# Patient Record
Sex: FEMALE | Race: WHITE | NOT HISPANIC OR LATINO | URBAN - METROPOLITAN AREA
[De-identification: names, ages, dates, MRNs, and addresses within clinical notes are randomized per-mention and may not be internally consistent; named-entity substitution may affect disease eponyms.]

---

## 2017-08-11 ENCOUNTER — HOSPITAL ENCOUNTER (OUTPATIENT)
Dept: FAMILY MEDICINE CLINIC | Facility: CLINIC | Age: 57
Discharge: HOME | End: 2017-08-11
Attending: NURSE PRACTITIONER | Admitting: NURSE PRACTITIONER

## 2019-05-24 ENCOUNTER — CONVERSION ENCOUNTER (OUTPATIENT)
Dept: FAMILY MEDICINE CLINIC | Facility: CLINIC | Age: 59
End: 2019-05-24

## 2019-05-24 LAB
ALBUMIN SERPL-MCNC: 4 G/DL (ref 3.6–5.1)
ALBUMIN/GLOB SERPL: ABNORMAL {RATIO} (ref 1–2.1)
ALP SERPL-CCNC: 77 UNITS/L (ref 33–130)
ALT SERPL-CCNC: 42 UNITS/L (ref 6–40)
AST SERPL-CCNC: 35 UNITS/L (ref 10–35)
BILIRUB SERPL-MCNC: 0.9 MG/DL (ref 0.2–1.2)
BUN SERPL-MCNC: 12 MG/DL (ref 7–25)
BUN/CREAT SERPL: ABNORMAL (ref 6–22)
CALCIUM SERPL-MCNC: 9.2 MG/DL (ref 8.6–10.2)
CHLORIDE SERPL-SCNC: 105 MMOL/L (ref 98–110)
CHOLEST SERPL-MCNC: 155 MG/DL (ref 125–200)
CHOLEST/HDLC SERPL: ABNORMAL {RATIO}
CONV % HGB A1C: ABNORMAL %
CONV CO2: 24 MMOL/L (ref 21–33)
CONV TOTAL PROTEIN: 7.2 G/DL (ref 6.2–8.3)
CREAT UR-MCNC: 0.8 MG/DL (ref 0.6–1.1)
ERYTHROCYTE [DISTWIDTH] IN BLOOD BY AUTOMATED COUNT: 12.8 % (ref 11–15)
GLOBULIN UR ELPH-MCNC: ABNORMAL G/DL (ref 2.2–3.9)
GLUCOSE SERPL-MCNC: 153 MG/DL (ref 65–99)
HCT VFR BLD AUTO: 44.1 % (ref 35–45)
HDLC SERPL-MCNC: 48 MG/DL
HGB BLD-MCNC: 15.1 G/DL (ref 11.7–15.5)
LDLC SERPL CALC-MCNC: 69 MG/DL
MCH RBC QN AUTO: 31.4 PG (ref 27–33)
MCHC RBC AUTO-ENTMCNC: ABNORMAL % (ref 32–36)
MCV RBC AUTO: 91.8 FL (ref 80–100)
PLATELET # BLD AUTO: ABNORMAL 10*3/MM3 (ref 140–400)
PMV BLD AUTO: 10.3 FL (ref 7.5–11.5)
POTASSIUM SERPL-SCNC: 3.9 MMOL/L (ref 3.5–5.3)
RBC # BLD AUTO: ABNORMAL 10*6/MM3 (ref 3.8–5.1)
SODIUM SERPL-SCNC: 138 MMOL/L (ref 135–146)
TRIGL SERPL-MCNC: 191 MG/DL
WBC # BLD AUTO: ABNORMAL 10*3/MM3 (ref 3.8–10.8)

## 2019-05-28 ENCOUNTER — CONVERSION ENCOUNTER (OUTPATIENT)
Dept: FAMILY MEDICINE CLINIC | Facility: CLINIC | Age: 59
End: 2019-05-28

## 2019-06-05 VITALS
DIASTOLIC BLOOD PRESSURE: 84 MMHG | HEIGHT: 66 IN | WEIGHT: 230 LBS | BODY MASS INDEX: 36.96 KG/M2 | OXYGEN SATURATION: 96 % | SYSTOLIC BLOOD PRESSURE: 137 MMHG | HEART RATE: 82 BPM

## 2019-06-06 NOTE — PROGRESS NOTES
"Visit Type:  Follow-up Visit  Referring Provider:  Alexia Vang NP  Primary Provider:  Alexia Vang NP    CC:  Follow Up on Lab Results.    History of Present Illness:  58-year-old obese white female with history of hyperlipidemia,  type 2 diabetes,  intermittent migraines,  f,atigue  dysphasia,  depression anxiety,  plantar fasciitis and insomnia who comes in today for follow-up visit refill medications   since I last visit she has been to Podiatry who injected her bone spurs and states that has helped a lot    Her sugars are averaging in the 140s to 150s in the morning and 400 in the evening and I am increasing her metformin to twice a day   blood pressure 136/84 heart rate 82 she denies any chest pain,  dyspnea,  tachycardia,  dizziness or edema   patient still refusing bone scans and colonoscopy.   weight is 230.   recent blood work within normal limits with exception of fasting blood sugar 159 and triglycerides 193     increase metformin 500 mg 2 twice a day   reconsider preventative health          Vital Signs:    Patient Profile:    58 Years Old Female  Height:     65.5 inches (166.37 cm)  Weight:     230 pounds  BMI:        37.69     O2 Sat:     96 %  Temp:       98.0 degrees F oral  Pulse rate: 82 / minute  Pulse rhythm:   regular  BP Sittin / 84  (right arm)    Cuff size:  regular      Problems: Active problems were reviewed with the patient during this visit.  Medications: Medications were reviewed with the patient during this visit.  Allergies: Allergies were reviewed with the patient during this visit.  No Known Allergy.  No Known Drug Allergy.        Vitals Entered By: Vivi Sims MA (May 28, 2019 9:32 AM)    Past History  Past Medical History (reviewed - no changes required): Hypertension  Headache, Migraine  Stress Reaction  dyslipidemia  \"itis's\" EGD Dr Shah 09  Glucose intolerance  Obesity  No Drug Allergies?    Surgical History (reviewed - no changes required): Total " Abdominal Hysterectomy 1996 ovaries remain  EGD    Family History (reviewed - no changes required): FH Diabetes  FH Heart Disease  FH Hypertension  FH Dyslipidemia  FH Multiple myeloma    Social History (reviewed - no changes required):     Family History Summary:      Reviewed history Last on 09/12/2018 and no changes required:05/28/2019      General Comments - FH:  FH Diabetes  FH Heart Disease  FH Hypertension  FH Dyslipidemia  FH Multiple myeloma      Social History:     Reviewed history from 12/26/2011 and no changes required:              Risk Factors:     Smoked Tobacco Use:  Never smoker  Smokeless Tobacco Use:  Never  Passive smoke exposure:  no  Drug use:  no  HIV high-risk behavior:  no  Caffeine use:  0 drinks per day  Alcohol use:  no  Exercise:  no  Seatbelt use:  100 %  Sun Exposure:  rarely    Family History Risk Factors:     Family History of MI in females < 65 years old:  yes     Family History of MI in males < 55 years old:  no        Review of Systems     MS       heel spurs      Physical Exam    General:      obese.    Ears:      TM's intact and clear with normal canals with grossly normal hearing.    Nose:      no deformity, discharge, inflammation, or lesions.    Mouth:      no deformity or lesions with good dentition.    Lungs:      clear bilaterally to auscultation.    Heart:      non-displaced PMI, chest non-tender; regular rate and rhythm, S1, S2 without murmurs, rubs, or gallops  Abdomen:       normal bowel sounds; no hepatosplenomegaly no ventral,umbilical hernias or masses noted.    Msk:       plantar fasciitis bilateral feet improved with steroid injection  Extremities:      no clubbing, cyanosis, edema, or deformity noted with normal full range of motion of joints of all four extremities   Neurologic:      no focal deficits, cranial nerves II-XII grossly intact with normal sensation, reflexes, coordination, muscle strength and tone.    Psych:      alert and cooperative;  normal mood and affect; normal attention span and concentration.        Blood Pressure:  Today's BP: 137/84 mm Hg    Labwork:   Most Recent Lab Results:   LDL: 69 mg/dL 05/24/2019  HbA1c: : 5.9 % OF TOTAL HGB % 05/24/2019      Impression & Recommendations:    Problem # 1:  Diabetes mellitus, type II (ICD-250.00) (QIF14-H32.9)  Assessment: Deteriorated    Her updated medication list for this problem includes:     Metformin Hcl 500 Mg Oral Tablet (Metformin hcl) ..... Take one (1) tablet by mouth twice a day     Zestoretic 20-12.5 Mg Oral Tablet (Lisinopril-hydrochlorothiazide) ..... Take 1 tablet by mouth daily      Problem # 2:  Plantar fasciitis, left (ICD-728.71) (QGZ25-I50.2)  Assessment: Improved    Her updated medication list for this problem includes:     Mobic 15 Mg Oral Tablet (Meloxicam) ..... Take one tablet daily      Problem # 3:  HYPERTENSION (ICD-401.9) (COC70-K60)  Assessment: Unchanged    Her updated medication list for this problem includes:     Zestoretic 20-12.5 Mg Oral Tablet (Lisinopril-hydrochlorothiazide) ..... Take 1 tablet by mouth daily     Atenolol 50 Mg Tablet (Atenolol) ..... Take 1 tablet by mouth daily      Problem # 4:  BMI 37-37.9 adult (ICD-V85.37) (HGT12-P87.37)  Assessment: Unchanged    Medications Added to Medication List This Visit:  1)  One-a-day Womens Formula Oral Tablet (Multiple vitamins-calcium) .... Take 1 tablet by mouth daily  2)  Metformin Hcl 500 Mg Oral Tablet (Metformin hcl) .... Take one (1) tablet by mouth twice a day    Complete Medication List:  1)  One-a-day Womens Formula Oral Tablet (Multiple vitamins-calcium) .... Take 1 tablet by mouth daily  2)  Accu-chek Softclix Lancets (Lancets) .... Check blood sugar once a day  3)  Accu-chek Fastclix Lancets (Lancets) .... Check bs once a day  4)  Accu-chek Guide in Vitro Strip (Glucose blood) .... Check bs once a day  5)  Mobic 15 Mg Oral Tablet (Meloxicam) .... Take one tablet daily  6)  Metformin Hcl 500 Mg Oral  Tablet (Metformin hcl) .... Take one (1) tablet by mouth twice a day  7)  Zestoretic 20-12.5 Mg Oral Tablet (Lisinopril-hydrochlorothiazide) .... Take 1 tablet by mouth daily  8)  Atenolol 50 Mg Tablet (Atenolol) .... Take 1 tablet by mouth daily  9)  Rosuvastatin Calcium 20 Mg Tab (Rosuvastatin calcium) .... Take 1 tablet by mouth everyday at bedtime  10)  Excedrin Migraine Tablet (Aspirin-acetaminophen-caffeine tabs) .... As needed        Patient Instructions:  1)  The importance of monitoring blood sugar regularly was reviewed.  2)  The importance of keeping the blood pressure at or below 130/80 to prevent stroke, heart attacks, kidney failure, blindness, and loss of limbs was reviewed.  3)  Encouraged a low fat/low cholesterol diet and handout regarding the Therapeutic Lifestyle Change Diet was given.  4)   reconsider preventative health test  5)   decrease metformin to 500 mg twice a day    Medications:  ROSUVASTATIN CALCIUM 20 MG TAB (ROSUVASTATIN CALCIUM) TAKE 1 TABLET BY MOUTH EVERYDAY AT BEDTIME  #90[Tablet] x 1      Entered by: Vivi Sims MA      Authorized by:  Alexia Vang NP      Electronically signed by:   Vivi Sims MA on 05/28/2019      Method used:    Electronically to               [facility]* ([facility])              [address] [place] [address]              Ph: [phone]              Fax: [phone]      RxID:   [id number]  ZESTORETIC 20-12.5 MG ORAL TABLET (LISINOPRIL-HYDROCHLOROTHIAZIDE) TAKE 1 TABLET BY MOUTH DAILY  #90[Tablet] x 1      Entered by: Vivi Sims MA      Authorized by:  Alexia Vang NP      Electronically signed by:   Vivi Sims MA on 05/28/2019      Method used:    Electronically to               [facility]* ([facility])              [address] [id number]              Ph: [phone]              Fax: [phone]      RxID:   [id number]  ATENOLOL 50 MG TABLET (ATENOLOL) Take 1 tablet by mouth  daily  #90[Tablet] x 1      Entered by: Vivi Sims MA      Authorized by:  Alexia Vang NP      Electronically signed by:   Vivi Sims MA on 05/28/2019      Method used:    Electronically to               [id number]* (retail)              [address] [facility] [place]  [address]              Ph: [phone]              Fax: [phone]      RxID:   [id number]  METFORMIN  MG ORAL TABLET (METFORMIN HCL) Take one (1) tablet by mouth twice a day  #180[Tablet] x 1      Entered by: Vivi Sims MA      Authorized by:  Alexia Vang NP      Electronically signed by:   Vivi Sims MA on 05/28/2019      Method used:    Electronically to               [id number]* (retail)              [address] [place]  [address]              Ph: [phone]              Fax: [phone]      RxID:   [id number]                Medication Administration    Orders Added:  1)  Ofc Vst, Est Level IV [90432]  ]      Electronically signed by Alexia Vnag NP on 05/28/2019 at 9:58 AM  ________________________________________________________________________       Disclaimer: Converted Note message may not contain all data elements that existed in the legacy source system. Please see [employer] Legacy System for the original note details.

## 2019-12-02 ENCOUNTER — OFFICE VISIT (OUTPATIENT)
Dept: FAMILY MEDICINE CLINIC | Facility: CLINIC | Age: 59
End: 2019-12-02

## 2019-12-02 VITALS
HEIGHT: 66 IN | WEIGHT: 227 LBS | BODY MASS INDEX: 36.48 KG/M2 | HEART RATE: 92 BPM | TEMPERATURE: 98.1 F | DIASTOLIC BLOOD PRESSURE: 84 MMHG | SYSTOLIC BLOOD PRESSURE: 137 MMHG | OXYGEN SATURATION: 97 %

## 2019-12-02 DIAGNOSIS — Z23 FLU VACCINE NEED: Primary | ICD-10-CM

## 2019-12-02 DIAGNOSIS — Z78.0 POSTMENOPAUSAL: ICD-10-CM

## 2019-12-02 DIAGNOSIS — Z12.31 OTHER SCREENING MAMMOGRAM: ICD-10-CM

## 2019-12-02 DIAGNOSIS — M77.9 BONE SPUR: ICD-10-CM

## 2019-12-02 DIAGNOSIS — Z00.00 PREVENTATIVE HEALTH CARE: ICD-10-CM

## 2019-12-02 DIAGNOSIS — E13.9 OTHER SPECIFIED DIABETES MELLITUS WITHOUT COMPLICATION, WITHOUT LONG-TERM CURRENT USE OF INSULIN (HCC): ICD-10-CM

## 2019-12-02 DIAGNOSIS — E78.2 MIXED HYPERLIPIDEMIA: ICD-10-CM

## 2019-12-02 DIAGNOSIS — Z23 NEED FOR IMMUNIZATION AGAINST INFLUENZA: ICD-10-CM

## 2019-12-02 PROCEDURE — 90674 CCIIV4 VAC NO PRSV 0.5 ML IM: CPT | Performed by: NURSE PRACTITIONER

## 2019-12-02 PROCEDURE — 99214 OFFICE O/P EST MOD 30 MIN: CPT | Performed by: NURSE PRACTITIONER

## 2019-12-02 PROCEDURE — 90471 IMMUNIZATION ADMIN: CPT | Performed by: NURSE PRACTITIONER

## 2019-12-02 RX ORDER — ROSUVASTATIN CALCIUM 20 MG/1
1 TABLET, COATED ORAL
Refills: 1 | COMMUNITY
Start: 2019-09-20 | End: 2019-12-02 | Stop reason: SDUPTHER

## 2019-12-02 RX ORDER — ATENOLOL 50 MG/1
50 TABLET ORAL DAILY
Qty: 90 TABLET | Refills: 2 | Status: SHIPPED | OUTPATIENT
Start: 2019-12-02 | End: 2020-09-22 | Stop reason: SDUPTHER

## 2019-12-02 RX ORDER — ATENOLOL 50 MG/1
50 TABLET ORAL DAILY
COMMUNITY
End: 2019-12-02 | Stop reason: SDUPTHER

## 2019-12-02 RX ORDER — ROSUVASTATIN CALCIUM 20 MG/1
20 TABLET, COATED ORAL
Qty: 90 TABLET | Refills: 2 | Status: SHIPPED | OUTPATIENT
Start: 2019-12-02 | End: 2020-09-22 | Stop reason: SDUPTHER

## 2019-12-02 RX ORDER — LISINOPRIL AND HYDROCHLOROTHIAZIDE 20; 12.5 MG/1; MG/1
1 TABLET ORAL DAILY
Refills: 1 | COMMUNITY
Start: 2019-09-20 | End: 2019-12-02 | Stop reason: SDUPTHER

## 2019-12-02 RX ORDER — LISINOPRIL AND HYDROCHLOROTHIAZIDE 20; 12.5 MG/1; MG/1
1 TABLET ORAL DAILY
Qty: 90 TABLET | Refills: 2 | Status: SHIPPED | OUTPATIENT
Start: 2019-12-02 | End: 2020-09-22 | Stop reason: SDUPTHER

## 2019-12-02 NOTE — PROGRESS NOTES
Subjective   Mauricio Jacome is a 59 y.o. female.      59-year-old obese white female with history hyperlipidemia, type 2 diabetes, intermittent migraines, fatigue, dysphasia, depression anxiety, plantar fasciitis and insomnia who comes in today for follow-up visit and fasting blood work.  Patient states her bone spur still hurts a little but not as bad as it was since she had steroid injections.   her glucometer is broken and she has not been checking her blood sugars so we are replacing the glucometer.  Her last fasting blood sugar was 158 with an A1c of 5.9  Blood pressure 134/84 heart rate 92 she denies any chest pain, dyspnea, tachycardia, dizziness or edema   weight is down 3 lb at 2:00 a.m. 27   patient still refuses colonoscopy but is getting mammogram and bone scan scheduled and is up-to-date on eye exam.  Flu shot today and she is going to find out if her insurance will cover Prevnar 13     flu shot today   mammogram/  DEXA scan   fasting blood work           The following portions of the patient's history were reviewed and updated as appropriate: allergies, current medications, past family history, past medical history, past social history, past surgical history and problem list.    Review of Systems   Constitutional: Negative.    Respiratory: Negative.    Cardiovascular: Negative.    Gastrointestinal: Negative.    Genitourinary: Negative.    Musculoskeletal: Negative.    Skin: Negative.    Neurological: Negative.    Psychiatric/Behavioral: Negative.        Objective   Physical Exam   Constitutional: She is oriented to person, place, and time. She appears well-developed and well-nourished.   Cardiovascular: Normal rate and regular rhythm.   Pulmonary/Chest: Effort normal and breath sounds normal.   Abdominal: Soft. Bowel sounds are normal.   Musculoskeletal: Normal range of motion.   Neurological: She is alert and oriented to person, place, and time.   Skin: Skin is warm and dry.   Psychiatric: She has a  normal mood and affect.         Assessment/Plan   Problems Addressed this Visit     None      Visit Diagnoses     Flu vaccine need    -  Primary    Relevant Orders    Flucelvax Quad=>4Years (PFS) (Completed)    Need for immunization against influenza        Mixed hyperlipidemia        Relevant Medications    rosuvastatin (CRESTOR) 20 MG tablet    Other Relevant Orders    Lipid Panel With LDL / HDL Ratio    Other specified diabetes mellitus without complication, without long-term current use of insulin (CMS/[facility])        Relevant Medications    metFORMIN (GLUCOPHAGE) 500 MG tablet    Other Relevant Orders    Comprehensive Metabolic Panel    Hemoglobin A1c    Preventative health care        Relevant Orders    CBC & Differential    Bone spur

## 2019-12-02 NOTE — PATIENT INSTRUCTIONS
Fasting blood work   keep appointment for bone scan and DEXA scan   find out about pneumonia supplements parents company   flu shot today   reconsider colonoscopy

## 2019-12-03 LAB
ALBUMIN SERPL-MCNC: 4.6 G/DL (ref 3.6–4.8)
ALBUMIN/GLOB SERPL: 1.7 {RATIO} (ref 1.2–2.2)
ALP SERPL-CCNC: 83 IU/L (ref 39–117)
ALT SERPL-CCNC: 47 IU/L (ref 0–32)
AST SERPL-CCNC: 34 IU/L (ref 0–40)
BASOPHILS # BLD AUTO: 0 X10E3/UL (ref 0–0.2)
BASOPHILS NFR BLD AUTO: 0 %
BILIRUB SERPL-MCNC: 0.7 MG/DL (ref 0–1.2)
BUN SERPL-MCNC: 10 MG/DL (ref 8–27)
BUN/CREAT SERPL: 13 (ref 12–28)
CALCIUM SERPL-MCNC: 9.7 MG/DL (ref 8.7–10.3)
CHLORIDE SERPL-SCNC: 103 MMOL/L (ref 96–106)
CHOLEST SERPL-MCNC: 153 MG/DL (ref 100–199)
CO2 SERPL-SCNC: 21 MMOL/L (ref 20–29)
CREAT SERPL-MCNC: 0.8 MG/DL (ref 0.57–1)
EOSINOPHIL # BLD AUTO: 0.1 X10E3/UL (ref 0–0.4)
EOSINOPHIL NFR BLD AUTO: 1 %
ERYTHROCYTE [DISTWIDTH] IN BLOOD BY AUTOMATED COUNT: 12.9 % (ref 12.3–15.4)
GLOBULIN SER CALC-MCNC: 2.7 G/DL (ref 1.5–4.5)
GLUCOSE SERPL-MCNC: 139 MG/DL (ref 65–99)
HBA1C MFR BLD: 6.4 % (ref 4.8–5.6)
HCT VFR BLD AUTO: 41.9 % (ref 34–46.6)
HDLC SERPL-MCNC: 45 MG/DL
HGB BLD-MCNC: 14.7 G/DL (ref 11.1–15.9)
IMM GRANULOCYTES # BLD AUTO: 0 X10E3/UL (ref 0–0.1)
IMM GRANULOCYTES NFR BLD AUTO: 0 %
LDLC SERPL CALC-MCNC: 63 MG/DL (ref 0–99)
LDLC/HDLC SERPL: 1.4 RATIO (ref 0–3.2)
LYMPHOCYTES # BLD AUTO: 3 X10E3/UL (ref 0.7–3.1)
LYMPHOCYTES NFR BLD AUTO: 30 %
MCH RBC QN AUTO: 31.1 PG (ref 26.6–33)
MCHC RBC AUTO-ENTMCNC: 35.1 G/DL (ref 31.5–35.7)
MCV RBC AUTO: 89 FL (ref 79–97)
MONOCYTES # BLD AUTO: 0.6 X10E3/UL (ref 0.1–0.9)
MONOCYTES NFR BLD AUTO: 6 %
NEUTROPHILS # BLD AUTO: 6.3 X10E3/UL (ref 1.4–7)
NEUTROPHILS NFR BLD AUTO: 63 %
PLATELET # BLD AUTO: 205 X10E3/UL (ref 150–450)
POTASSIUM SERPL-SCNC: 4 MMOL/L (ref 3.5–5.2)
PROT SERPL-MCNC: 7.3 G/DL (ref 6–8.5)
RBC # BLD AUTO: 4.72 X10E6/UL (ref 3.77–5.28)
SODIUM SERPL-SCNC: 144 MMOL/L (ref 134–144)
TRIGL SERPL-MCNC: 225 MG/DL (ref 0–149)
VLDLC SERPL CALC-MCNC: 45 MG/DL (ref 5–40)
WBC # BLD AUTO: 10 X10E3/UL (ref 3.4–10.8)

## 2020-01-09 DIAGNOSIS — Z12.31 OTHER SCREENING MAMMOGRAM: ICD-10-CM

## 2020-01-09 DIAGNOSIS — Z78.0 POSTMENOPAUSAL: ICD-10-CM

## 2020-01-13 ENCOUNTER — TELEPHONE (OUTPATIENT)
Dept: FAMILY MEDICINE CLINIC | Facility: CLINIC | Age: 60
End: 2020-01-13

## 2020-01-13 DIAGNOSIS — R92.8 ABNORMAL MAMMOGRAM OF RIGHT BREAST: Primary | ICD-10-CM

## 2020-01-15 DIAGNOSIS — R92.8 ABNORMAL MAMMOGRAM OF RIGHT BREAST: ICD-10-CM

## 2020-08-17 RX ORDER — ATENOLOL 50 MG/1
TABLET ORAL
Qty: 90 TABLET | Refills: 2 | OUTPATIENT
Start: 2020-08-17

## 2020-09-06 RX ORDER — LISINOPRIL AND HYDROCHLOROTHIAZIDE 20; 12.5 MG/1; MG/1
TABLET ORAL
Qty: 90 TABLET | Refills: 2 | OUTPATIENT
Start: 2020-09-06

## 2020-09-06 RX ORDER — ROSUVASTATIN CALCIUM 20 MG/1
TABLET, COATED ORAL
Qty: 90 TABLET | Refills: 2 | OUTPATIENT
Start: 2020-09-06

## 2020-09-22 ENCOUNTER — OFFICE VISIT (OUTPATIENT)
Dept: FAMILY MEDICINE CLINIC | Facility: CLINIC | Age: 60
End: 2020-09-22

## 2020-09-22 VITALS
DIASTOLIC BLOOD PRESSURE: 81 MMHG | BODY MASS INDEX: 35.52 KG/M2 | HEART RATE: 71 BPM | HEIGHT: 66 IN | TEMPERATURE: 97 F | WEIGHT: 221 LBS | SYSTOLIC BLOOD PRESSURE: 141 MMHG | OXYGEN SATURATION: 97 %

## 2020-09-22 DIAGNOSIS — E11.9 TYPE 2 DIABETES MELLITUS WITHOUT COMPLICATION, WITHOUT LONG-TERM CURRENT USE OF INSULIN (HCC): ICD-10-CM

## 2020-09-22 DIAGNOSIS — Z00.00 PREVENTATIVE HEALTH CARE: ICD-10-CM

## 2020-09-22 DIAGNOSIS — Z23 NEED FOR IMMUNIZATION AGAINST INFLUENZA: ICD-10-CM

## 2020-09-22 DIAGNOSIS — Z23 FLU VACCINE NEED: Primary | ICD-10-CM

## 2020-09-22 DIAGNOSIS — E78.2 MIXED HYPERLIPIDEMIA: ICD-10-CM

## 2020-09-22 DIAGNOSIS — I10 ESSENTIAL HYPERTENSION: ICD-10-CM

## 2020-09-22 PROCEDURE — 90471 IMMUNIZATION ADMIN: CPT | Performed by: NURSE PRACTITIONER

## 2020-09-22 PROCEDURE — 99214 OFFICE O/P EST MOD 30 MIN: CPT | Performed by: NURSE PRACTITIONER

## 2020-09-22 PROCEDURE — 90686 IIV4 VACC NO PRSV 0.5 ML IM: CPT | Performed by: NURSE PRACTITIONER

## 2020-09-22 RX ORDER — LISINOPRIL AND HYDROCHLOROTHIAZIDE 20; 12.5 MG/1; MG/1
1 TABLET ORAL DAILY
Qty: 90 TABLET | Refills: 2 | Status: SHIPPED | OUTPATIENT
Start: 2020-09-22 | End: 2021-03-22 | Stop reason: ALTCHOICE

## 2020-09-22 RX ORDER — ROSUVASTATIN CALCIUM 20 MG/1
20 TABLET, COATED ORAL
Qty: 90 TABLET | Refills: 2 | Status: SHIPPED | OUTPATIENT
Start: 2020-09-22 | End: 2021-03-22 | Stop reason: ALTCHOICE

## 2020-09-22 RX ORDER — ATENOLOL 50 MG/1
50 TABLET ORAL DAILY
Qty: 90 TABLET | Refills: 2 | Status: SHIPPED | OUTPATIENT
Start: 2020-09-22 | End: 2021-06-16

## 2020-09-22 NOTE — PROGRESS NOTES
"    Mauricio Jacome is a 60 y.o. female.     60-year-old obese white female with history of hyperlipidemia, type 2 diabetes, intermittent migraines, fatigue, dysphasia, depression anxiety, plantar fasciitis and insomnia who comes in for follow-up visit today.  Is been a year since patient has been here.  She states her blood sugars are averaging less than 130 fasting in the morning.  Her last fasting blood sugar was 139 with an A1c of 6.4 and triglycerides of 225  Blood pressure 140/80 heart rate 70 she denies any chest pain, dyspnea, tachycardia or dizziness  She does complain of mild insomnia but does not want any medication for it at this time  She also has complained of some mild intermittent GERD and she is going to try taking Pepcid twice a day  She is up-to-date on mammogram DEXA scan but refuses colonoscopy    Fasting blood work  Schedule eye exam  Pepcid 20 mg twice daily as needed for GERD  Follow-up 6 months       The following portions of the patient's history were reviewed and updated as appropriate: allergies, current medications, past family history, past medical history, past social history, past surgical history and problem list.    Vitals:    09/22/20 0903   BP: 141/81   BP Location: Right arm   Patient Position: Sitting   Cuff Size: Large Adult   Pulse: 71   Temp: 97 °F (36.1 °C)   TempSrc: Temporal   SpO2: 97%   Weight: 100 kg (221 lb)   Height: 166.4 cm (65.5\")     Body mass index is 36.22 kg/m².    Past Medical History:   Diagnosis Date   • Diabetes mellitus (CMS/HCC)    • Hyperlipidemia    • Hypertension      Past Surgical History:   Procedure Laterality Date   • APPENDECTOMY     • CHOLECYSTECTOMY     • HYSTERECTOMY       Family History   Problem Relation Age of Onset   • Heart failure Mother    • Multiple myeloma Mother    • Kidney disease Father    • Dementia Father    • Diabetes Father    • Heart disease Father      Immunization History   Administered Date(s) Administered   • " Flulaval/Fluarix Quad 09/22/2020   • Influenza Quad Vaccine (Inpatient) 12/01/2014   • Influenza, Unspecified 10/12/2018   • flucelvax quad pfs =>4 YRS 12/02/2019       Office Visit on 12/02/2019   Component Date Value Ref Range Status   • WBC 12/02/2019 10.0  3.4 - 10.8 x10E3/uL Final   • RBC 12/02/2019 4.72  3.77 - 5.28 x10E6/uL Final   • Hemoglobin 12/02/2019 14.7  11.1 - 15.9 g/dL Final   • Hematocrit 12/02/2019 41.9  34.0 - 46.6 % Final   • MCV 12/02/2019 89  79 - 97 fL Final   • MCH 12/02/2019 31.1  26.6 - 33.0 pg Final   • MCHC 12/02/2019 35.1  31.5 - 35.7 g/dL Final   • RDW 12/02/2019 12.9  12.3 - 15.4 % Final   • Platelets 12/02/2019 205  150 - 450 x10E3/uL Final   • Neutrophil Rel % 12/02/2019 63  Not Estab. % Final   • Lymphocyte Rel % 12/02/2019 30  Not Estab. % Final   • Monocyte Rel % 12/02/2019 6  Not Estab. % Final   • Eosinophil Rel % 12/02/2019 1  Not Estab. % Final   • Basophil Rel % 12/02/2019 0  Not Estab. % Final   • Neutrophils Absolute 12/02/2019 6.3  1.4 - 7.0 x10E3/uL Final   • Lymphocytes Absolute 12/02/2019 3.0  0.7 - 3.1 x10E3/uL Final   • Monocytes Absolute 12/02/2019 0.6  0.1 - 0.9 x10E3/uL Final   • Eosinophils Absolute 12/02/2019 0.1  0.0 - 0.4 x10E3/uL Final   • Basophils Absolute 12/02/2019 0.0  0.0 - 0.2 x10E3/uL Final   • Immature Granulocyte Rel % 12/02/2019 0  Not Estab. % Final   • Immature Grans Absolute 12/02/2019 0.0  0.0 - 0.1 x10E3/uL Final   • Glucose 12/02/2019 139* 65 - 99 mg/dL Final   • BUN 12/02/2019 10  8 - 27 mg/dL Final   • Creatinine 12/02/2019 0.80  0.57 - 1.00 mg/dL Final   • eGFR Non African Am 12/02/2019 80  >59 mL/min/1.73 Final   • eGFR African Am 12/02/2019 93  >59 mL/min/1.73 Final   • BUN/Creatinine Ratio 12/02/2019 13  12 - 28 Final   • Sodium 12/02/2019 144  134 - 144 mmol/L Final   • Potassium 12/02/2019 4.0  3.5 - 5.2 mmol/L Final   • Chloride 12/02/2019 103  96 - 106 mmol/L Final   • Total CO2 12/02/2019 21  20 - 29 mmol/L Final   • Calcium  12/02/2019 9.7  8.7 - 10.3 mg/dL Final   • Total Protein 12/02/2019 7.3  6.0 - 8.5 g/dL Final   • Albumin 12/02/2019 4.6  3.6 - 4.8 g/dL Final   • Globulin 12/02/2019 2.7  1.5 - 4.5 g/dL Final   • A/G Ratio 12/02/2019 1.7  1.2 - 2.2 Final   • Total Bilirubin 12/02/2019 0.7  0.0 - 1.2 mg/dL Final   • Alkaline Phosphatase 12/02/2019 83  39 - 117 IU/L Final   • AST (SGOT) 12/02/2019 34  0 - 40 IU/L Final   • ALT (SGPT) 12/02/2019 47* 0 - 32 IU/L Final   • Total Cholesterol 12/02/2019 153  100 - 199 mg/dL Final   • Triglycerides 12/02/2019 225* 0 - 149 mg/dL Final   • HDL Cholesterol 12/02/2019 45  >39 mg/dL Final   • VLDL Cholesterol 12/02/2019 45* 5 - 40 mg/dL Final   • LDL Cholesterol  12/02/2019 63  0 - 99 mg/dL Final   • LDL/HDL Ratio 12/02/2019 1.4  0.0 - 3.2 ratio Final    Comment:                                     LDL/HDL Ratio                                              Men  Women                                1/2 Avg.Risk  1.0    1.5                                    Avg.Risk  3.6    3.2                                 2X Avg.Risk  6.2    5.0                                 3X Avg.Risk  8.0    6.1     • Hemoglobin A1C 12/02/2019 6.4* 4.8 - 5.6 % Final    Comment:          Prediabetes: 5.7 - 6.4           Diabetes: >6.4           Glycemic control for adults with diabetes: <7.0           Review of Systems   Constitutional: Negative.    HENT: Negative.    Respiratory: Negative.    Cardiovascular: Negative.    Gastrointestinal: Positive for GERD.   Genitourinary: Negative.    Musculoskeletal: Negative.    Skin: Negative.    Neurological: Negative.    Psychiatric/Behavioral: Negative.        Objective   Physical Exam  Constitutional:       Appearance: Normal appearance.   HENT:      Head: Normocephalic.   Neck:      Musculoskeletal: Normal range of motion.   Cardiovascular:      Rate and Rhythm: Normal rate.      Pulses: Normal pulses.      Heart sounds: Normal heart sounds.   Pulmonary:      Effort: Pulmonary  effort is normal.   Abdominal:      General: Bowel sounds are normal.   Musculoskeletal: Normal range of motion.   Skin:     General: Skin is warm and dry.   Neurological:      General: No focal deficit present.      Mental Status: She is alert and oriented to person, place, and time.   Psychiatric:         Mood and Affect: Mood normal.         Behavior: Behavior normal.         Procedures    Assessment/Plan   Problems Addressed this Visit        Cardiovascular and Mediastinum    Essential hypertension    Relevant Medications    lisinopril-hydrochlorothiazide (PRINZIDE,ZESTORETIC) 20-12.5 MG per tablet    atenolol (TENORMIN) 50 MG tablet    Mixed hyperlipidemia    Relevant Medications    rosuvastatin (CRESTOR) 20 MG tablet    Other Relevant Orders    Lipid Panel With LDL / HDL Ratio       Endocrine    Type 2 diabetes mellitus without complication, without long-term current use of insulin (CMS/[facility])    Relevant Medications    metFORMIN (GLUCOPHAGE) 500 MG tablet    Other Relevant Orders    Comprehensive Metabolic Panel    Hemoglobin A1c      Other Visit Diagnoses     Flu vaccine need    -  Primary    Relevant Orders    Fluarix/Fluzone/Afluria Quad>6 Months (Completed)    Preventative health care        Relevant Orders    CBC & Differential    Need for immunization against influenza        BMI 36.0-36.9,adult                Current Outpatient Medications:   •  atenolol (TENORMIN) 50 MG tablet, Take 1 tablet by mouth Daily., Disp: 90 tablet, Rfl: 2  •  lisinopril-hydrochlorothiazide (PRINZIDE,ZESTORETIC) 20-12.5 MG per tablet, Take 1 tablet by mouth Daily., Disp: 90 tablet, Rfl: 2  •  metFORMIN (GLUCOPHAGE) 500 MG tablet, Take 1 tablet by mouth 2 (Two) Times a Day With Meals., Disp: 180 tablet, Rfl: 1  •  rosuvastatin (CRESTOR) 20 MG tablet, Take 1 tablet by mouth every night at bedtime., Disp: 90 tablet, Rfl: 2

## 2020-09-22 NOTE — PATIENT INSTRUCTIONS
Fasting blood work  Schedule eye exam  Pepcid twice a day as needed for GERD follow-up fasting 6 months

## 2020-09-23 LAB
ALBUMIN SERPL-MCNC: 4.6 G/DL (ref 3.8–4.8)
ALBUMIN/GLOB SERPL: 1.6 {RATIO} (ref 1.2–2.2)
ALP SERPL-CCNC: 83 IU/L (ref 39–117)
ALT SERPL-CCNC: 36 IU/L (ref 0–32)
AST SERPL-CCNC: 32 IU/L (ref 0–40)
BASOPHILS # BLD AUTO: 0 X10E3/UL (ref 0–0.2)
BASOPHILS NFR BLD AUTO: 0 %
BILIRUB SERPL-MCNC: 0.8 MG/DL (ref 0–1.2)
BUN SERPL-MCNC: 12 MG/DL (ref 8–27)
BUN/CREAT SERPL: 14 (ref 12–28)
CALCIUM SERPL-MCNC: 9.7 MG/DL (ref 8.7–10.3)
CHLORIDE SERPL-SCNC: 102 MMOL/L (ref 96–106)
CHOLEST SERPL-MCNC: 153 MG/DL (ref 100–199)
CO2 SERPL-SCNC: 25 MMOL/L (ref 20–29)
CREAT SERPL-MCNC: 0.86 MG/DL (ref 0.57–1)
EOSINOPHIL # BLD AUTO: 0.1 X10E3/UL (ref 0–0.4)
EOSINOPHIL NFR BLD AUTO: 1 %
ERYTHROCYTE [DISTWIDTH] IN BLOOD BY AUTOMATED COUNT: 12.6 % (ref 11.7–15.4)
GLOBULIN SER CALC-MCNC: 2.9 G/DL (ref 1.5–4.5)
GLUCOSE SERPL-MCNC: 126 MG/DL (ref 65–99)
HBA1C MFR BLD: 6 % (ref 4.8–5.6)
HCT VFR BLD AUTO: 42.6 % (ref 34–46.6)
HDLC SERPL-MCNC: 47 MG/DL
HGB BLD-MCNC: 14.6 G/DL (ref 11.1–15.9)
IMM GRANULOCYTES # BLD AUTO: 0 X10E3/UL (ref 0–0.1)
IMM GRANULOCYTES NFR BLD AUTO: 0 %
LDLC SERPL CALC-MCNC: 74 MG/DL (ref 0–99)
LDLC/HDLC SERPL: 1.6 RATIO (ref 0–3.2)
LYMPHOCYTES # BLD AUTO: 3 X10E3/UL (ref 0.7–3.1)
LYMPHOCYTES NFR BLD AUTO: 33 %
MCH RBC QN AUTO: 31.2 PG (ref 26.6–33)
MCHC RBC AUTO-ENTMCNC: 34.3 G/DL (ref 31.5–35.7)
MCV RBC AUTO: 91 FL (ref 79–97)
MONOCYTES # BLD AUTO: 0.6 X10E3/UL (ref 0.1–0.9)
MONOCYTES NFR BLD AUTO: 6 %
NEUTROPHILS # BLD AUTO: 5.2 X10E3/UL (ref 1.4–7)
NEUTROPHILS NFR BLD AUTO: 60 %
PLATELET # BLD AUTO: 209 X10E3/UL (ref 150–450)
POTASSIUM SERPL-SCNC: 4.2 MMOL/L (ref 3.5–5.2)
PROT SERPL-MCNC: 7.5 G/DL (ref 6–8.5)
RBC # BLD AUTO: 4.68 X10E6/UL (ref 3.77–5.28)
SODIUM SERPL-SCNC: 142 MMOL/L (ref 134–144)
TRIGL SERPL-MCNC: 190 MG/DL (ref 0–149)
VLDLC SERPL CALC-MCNC: 32 MG/DL (ref 5–40)
WBC # BLD AUTO: 8.9 X10E3/UL (ref 3.4–10.8)

## 2020-10-02 ENCOUNTER — TELEPHONE (OUTPATIENT)
Dept: FAMILY MEDICINE CLINIC | Facility: CLINIC | Age: 60
End: 2020-10-02

## 2020-10-02 DIAGNOSIS — Z12.11 COLON CANCER SCREENING: Primary | ICD-10-CM

## 2020-12-09 ENCOUNTER — OFFICE VISIT (OUTPATIENT)
Dept: FAMILY MEDICINE CLINIC | Facility: CLINIC | Age: 60
End: 2020-12-09

## 2020-12-09 VITALS
HEIGHT: 66 IN | BODY MASS INDEX: 35.03 KG/M2 | OXYGEN SATURATION: 97 % | HEART RATE: 80 BPM | SYSTOLIC BLOOD PRESSURE: 128 MMHG | DIASTOLIC BLOOD PRESSURE: 77 MMHG | WEIGHT: 218 LBS | TEMPERATURE: 98.2 F

## 2020-12-09 DIAGNOSIS — R05.9 COUGH: ICD-10-CM

## 2020-12-09 DIAGNOSIS — R50.9 FEVER, UNSPECIFIED FEVER CAUSE: Primary | ICD-10-CM

## 2020-12-09 DIAGNOSIS — K29.70 GASTRITIS WITHOUT BLEEDING, UNSPECIFIED CHRONICITY, UNSPECIFIED GASTRITIS TYPE: ICD-10-CM

## 2020-12-09 LAB
EXPIRATION DATE: NORMAL
FLUAV AG NPH QL: NEGATIVE
FLUBV AG NPH QL: NEGATIVE
INTERNAL CONTROL: NORMAL
Lab: NORMAL

## 2020-12-09 PROCEDURE — 87804 INFLUENZA ASSAY W/OPTIC: CPT | Performed by: NURSE PRACTITIONER

## 2020-12-09 PROCEDURE — 99214 OFFICE O/P EST MOD 30 MIN: CPT | Performed by: NURSE PRACTITIONER

## 2020-12-09 RX ORDER — SUCRALFATE 1 G/1
1 TABLET ORAL 2 TIMES DAILY PRN
Qty: 30 TABLET | Refills: 0 | Status: SHIPPED | OUTPATIENT
Start: 2020-12-09 | End: 2022-09-22

## 2020-12-09 RX ORDER — OMEPRAZOLE 40 MG/1
40 CAPSULE, DELAYED RELEASE ORAL DAILY PRN
COMMUNITY
Start: 2020-11-16 | End: 2022-09-22

## 2020-12-09 NOTE — PROGRESS NOTES
Mauricio Jacome is a 60 y.o. female.     60-year-old obese white female with history of hyperlipidemia, type 2 diabetes, intermittent migraines, fatigue, dysphagia, depression anxiety, plantar fasciitis and insomnia who comes in today with complaints of cough low-grade fever diarrhea loss of taste and smell since Thanksgiving.  Patient however did not come in at onset of symptoms and she states today symptoms are starting to improve a lot.  Her influenza was negative we did do a Covid test we will see what the results from that show and patient is going to home quarantine for 2 weeks anyway  Patient states blood sugars are less than 140 fasting in the morning however they have been running a little higher the last 2 weeks.  Her last A1c was 6.0 fasting blood sugar 126 triglycerides 190 and on next visit we will do fasting blood work  Blood pressure 128/76 heart rate 80 she denies any chest pain, dyspnea, tachycardia or dizziness  Patient been having some reflux issues she had a recent colonoscopy and EGD which was negative with the exception of mild gastritis and was placed on Protonix.  I ordered her some Carafate to do twice a day dose see if we will have to take Protonix daily  Weight is down 3 pounds at 218 with a BMI of 35.7 and we once again visited diet and weight loss  Patient up-to-date on DEXA scan mammogram is due but she wants to wait till the summer and her eye exam is also due.      Carafate 1 g 1-4 times a day  Fasting blood work next visit  Influenza/Covid testing  Diet and exercise as discussed  Mammogram due in the spring  Schedule eye exam avoid  Caffeine and juices           The following portions of the patient's history were reviewed and updated as appropriate: allergies, current medications, past family history, past medical history, past social history, past surgical history and problem list.    Vitals:    12/09/20 0916   BP: 128/77   BP Location: Right arm   Patient Position: Sitting  "  Cuff Size: Adult   Pulse: 80   Temp: 98.2 °F (36.8 °C)   TempSrc: Oral   SpO2: 97%   Weight: 98.9 kg (218 lb)   Height: 166.4 cm (65.5\")     Body mass index is 35.73 kg/m².    Past Medical History:   Diagnosis Date   • Diabetes mellitus (CMS/HCC)    • Hyperlipidemia    • Hypertension      Past Surgical History:   Procedure Laterality Date   • APPENDECTOMY     • CHOLECYSTECTOMY     • HYSTERECTOMY       Family History   Problem Relation Age of Onset   • Heart failure Mother    • Multiple myeloma Mother    • Kidney disease Father    • Dementia Father    • Diabetes Father    • Heart disease Father      Immunization History   Administered Date(s) Administered   • Flulaval/Fluarix/Fluzone Quad 09/22/2020   • Influenza Quad Vaccine (Inpatient) 12/01/2014   • Influenza, Unspecified 10/12/2018   • flucelvax quad pfs =>4 YRS 12/02/2019       Office Visit on 09/22/2020   Component Date Value Ref Range Status   • WBC 09/22/2020 8.9  3.4 - 10.8 x10E3/uL Final   • RBC 09/22/2020 4.68  3.77 - 5.28 x10E6/uL Final   • Hemoglobin 09/22/2020 14.6  11.1 - 15.9 g/dL Final   • Hematocrit 09/22/2020 42.6  34.0 - 46.6 % Final   • MCV 09/22/2020 91  79 - 97 fL Final   • MCH 09/22/2020 31.2  26.6 - 33.0 pg Final   • MCHC 09/22/2020 34.3  31.5 - 35.7 g/dL Final   • RDW 09/22/2020 12.6  11.7 - 15.4 % Final   • Platelets 09/22/2020 209  150 - 450 x10E3/uL Final   • Neutrophil Rel % 09/22/2020 60  Not Estab. % Final   • Lymphocyte Rel % 09/22/2020 33  Not Estab. % Final   • Monocyte Rel % 09/22/2020 6  Not Estab. % Final   • Eosinophil Rel % 09/22/2020 1  Not Estab. % Final   • Basophil Rel % 09/22/2020 0  Not Estab. % Final   • Neutrophils Absolute 09/22/2020 5.2  1.4 - 7.0 x10E3/uL Final   • Lymphocytes Absolute 09/22/2020 3.0  0.7 - 3.1 x10E3/uL Final   • Monocytes Absolute 09/22/2020 0.6  0.1 - 0.9 x10E3/uL Final   • Eosinophils Absolute 09/22/2020 0.1  0.0 - 0.4 x10E3/uL Final   • Basophils Absolute 09/22/2020 0.0  0.0 - 0.2 x10E3/uL " Final   • Immature Granulocyte Rel % 09/22/2020 0  Not Estab. % Final   • Immature Grans Absolute 09/22/2020 0.0  0.0 - 0.1 x10E3/uL Final   • Glucose 09/22/2020 126* 65 - 99 mg/dL Final   • BUN 09/22/2020 12  8 - 27 mg/dL Final   • Creatinine 09/22/2020 0.86  0.57 - 1.00 mg/dL Final   • eGFR Non  Am 09/22/2020 73  >59 mL/min/1.73 Final   • eGFR African Am 09/22/2020 84  >59 mL/min/1.73 Final   • BUN/Creatinine Ratio 09/22/2020 14  12 - 28 Final   • Sodium 09/22/2020 142  134 - 144 mmol/L Final   • Potassium 09/22/2020 4.2  3.5 - 5.2 mmol/L Final   • Chloride 09/22/2020 102  96 - 106 mmol/L Final   • Total CO2 09/22/2020 25  20 - 29 mmol/L Final   • Calcium 09/22/2020 9.7  8.7 - 10.3 mg/dL Final   • Total Protein 09/22/2020 7.5  6.0 - 8.5 g/dL Final   • Albumin 09/22/2020 4.6  3.8 - 4.8 g/dL Final   • Globulin 09/22/2020 2.9  1.5 - 4.5 g/dL Final   • A/G Ratio 09/22/2020 1.6  1.2 - 2.2 Final   • Total Bilirubin 09/22/2020 0.8  0.0 - 1.2 mg/dL Final   • Alkaline Phosphatase 09/22/2020 83  39 - 117 IU/L Final   • AST (SGOT) 09/22/2020 32  0 - 40 IU/L Final   • ALT (SGPT) 09/22/2020 36* 0 - 32 IU/L Final   • Total Cholesterol 09/22/2020 153  100 - 199 mg/dL Final   • Triglycerides 09/22/2020 190* 0 - 149 mg/dL Final   • HDL Cholesterol 09/22/2020 47  >39 mg/dL Final   • VLDL Cholesterol Obie 09/22/2020 32  5 - 40 mg/dL Final   • LDL Chol Calc (NIH) 09/22/2020 74  0 - 99 mg/dL Final   • LDL/HDL RATIO 09/22/2020 1.6  0.0 - 3.2 ratio Final    Comment:                                     LDL/HDL Ratio                                              Men  Women                                1/2 Avg.Risk  1.0    1.5                                    Avg.Risk  3.6    3.2                                 2X Avg.Risk  6.2    5.0                                 3X Avg.Risk  8.0    6.1     • Hemoglobin A1C 09/22/2020 6.0* 4.8 - 5.6 % Final    Comment:          Prediabetes: 5.7 - 6.4           Diabetes: >6.4           Glycemic  control for adults with diabetes: <7.0           Review of Systems   Constitutional: Positive for fatigue and fever.   HENT: Negative.    Respiratory: Positive for cough.    Cardiovascular: Negative.    Gastrointestinal: Positive for GERD.   Genitourinary: Negative.    Musculoskeletal: Negative.    Neurological: Negative.    Psychiatric/Behavioral: Negative.        Objective   Physical Exam  Constitutional:       Appearance: Normal appearance.   HENT:      Head: Normocephalic.   Neck:      Musculoskeletal: Normal range of motion.   Cardiovascular:      Rate and Rhythm: Normal rate and regular rhythm.      Pulses: Normal pulses.      Heart sounds: Normal heart sounds.   Pulmonary:      Effort: Pulmonary effort is normal.   Musculoskeletal: Normal range of motion.   Skin:     General: Skin is warm and dry.   Neurological:      General: No focal deficit present.      Mental Status: She is alert and oriented to person, place, and time.   Psychiatric:         Mood and Affect: Mood normal.         Behavior: Behavior normal.         Procedures    Assessment/Plan   Diagnoses and all orders for this visit:    1. Fever, unspecified fever cause (Primary)  -     POC Influenza A / B  -     COVID-19,[facility] ROUTINE, NP/OP SWAB IN TRANSPORT MEDIA OR ESWAB 72 HR TAT - Swab, Nasopharynx    2. Cough  -     POC Influenza A / B  -     COVID-19,[facility] ROUTINE, NP/OP SWAB IN TRANSPORT MEDIA OR ESWAB 72 HR TAT - Swab, Nasopharynx    3. Gastritis without bleeding, unspecified chronicity, unspecified gastritis type    4. BMI 35.0-35.9,adult    Other orders  -     sucralfate (Carafate) 1 g tablet; Take 1 tablet by mouth 2 (Two) Times a Day As Needed (gerd).  Dispense: 30 tablet; Refill: 0          Current Outpatient Medications:   •  atenolol (TENORMIN) 50 MG tablet, Take 1 tablet by mouth Daily., Disp: 90 tablet, Rfl: 2  •  lisinopril-hydrochlorothiazide (PRINZIDE,ZESTORETIC) 20-12.5 MG per tablet, Take 1 tablet by mouth Daily., Disp: 90  tablet, Rfl: 2  •  metFORMIN (GLUCOPHAGE) 500 MG tablet, Take 1 tablet by mouth 2 (Two) Times a Day With Meals., Disp: 180 tablet, Rfl: 1  •  omeprazole (priLOSEC) 40 MG capsule, Take 40 mg by mouth Daily., Disp: , Rfl:   •  rosuvastatin (CRESTOR) 20 MG tablet, Take 1 tablet by mouth every night at bedtime., Disp: 90 tablet, Rfl: 2  •  sucralfate (Carafate) 1 g tablet, Take 1 tablet by mouth 2 (Two) Times a Day As Needed (gerd)., Disp: 30 tablet, Rfl: 0

## 2020-12-09 NOTE — PATIENT INSTRUCTIONS
Carafate as directed for stomach avoid caffeine juices and greasy foods  Covid and influenza testing today  Fasting blood work next visit  Diet and exercise as discussed for weight loss  Schedule eye exam and mammogram in the spring  Follow-up 3 months

## 2020-12-11 LAB — SARS-COV-2 RNA RESP QL NAA+PROBE: NOT DETECTED

## 2021-03-22 ENCOUNTER — OFFICE VISIT (OUTPATIENT)
Dept: FAMILY MEDICINE CLINIC | Facility: CLINIC | Age: 61
End: 2021-03-22

## 2021-03-22 VITALS
DIASTOLIC BLOOD PRESSURE: 79 MMHG | BODY MASS INDEX: 35.2 KG/M2 | SYSTOLIC BLOOD PRESSURE: 160 MMHG | OXYGEN SATURATION: 99 % | HEIGHT: 66 IN | HEART RATE: 71 BPM | TEMPERATURE: 96 F | WEIGHT: 219 LBS

## 2021-03-22 DIAGNOSIS — E78.2 MIXED HYPERLIPIDEMIA: ICD-10-CM

## 2021-03-22 DIAGNOSIS — I10 ESSENTIAL HYPERTENSION: ICD-10-CM

## 2021-03-22 DIAGNOSIS — E11.9 TYPE 2 DIABETES MELLITUS WITHOUT COMPLICATION, WITHOUT LONG-TERM CURRENT USE OF INSULIN (HCC): Primary | ICD-10-CM

## 2021-03-22 DIAGNOSIS — Z00.00 PREVENTATIVE HEALTH CARE: ICD-10-CM

## 2021-03-22 DIAGNOSIS — G61.89 OTHER INFLAMMATORY POLYNEUROPATHIES (HCC): ICD-10-CM

## 2021-03-22 DIAGNOSIS — M72.2 PLANTAR FASCIITIS OF LEFT FOOT: ICD-10-CM

## 2021-03-22 DIAGNOSIS — E66.01 CLASS 2 SEVERE OBESITY DUE TO EXCESS CALORIES WITH SERIOUS COMORBIDITY AND BODY MASS INDEX (BMI) OF 35.0 TO 35.9 IN ADULT (HCC): ICD-10-CM

## 2021-03-22 PROBLEM — E66.812 CLASS 2 SEVERE OBESITY DUE TO EXCESS CALORIES WITH SERIOUS COMORBIDITY AND BODY MASS INDEX (BMI) OF 35.0 TO 35.9 IN ADULT: Status: ACTIVE | Noted: 2021-03-22

## 2021-03-22 PROCEDURE — 99214 OFFICE O/P EST MOD 30 MIN: CPT | Performed by: NURSE PRACTITIONER

## 2021-03-22 RX ORDER — LISINOPRIL AND HYDROCHLOROTHIAZIDE 25; 20 MG/1; MG/1
1 TABLET ORAL DAILY
Qty: 90 TABLET | Refills: 1 | Status: SHIPPED | OUTPATIENT
Start: 2021-03-22 | End: 2021-09-22 | Stop reason: SDUPTHER

## 2021-03-22 RX ORDER — PRAVASTATIN SODIUM 20 MG
20 TABLET ORAL DAILY
Qty: 90 TABLET | Refills: 1 | Status: SHIPPED | OUTPATIENT
Start: 2021-03-22 | End: 2022-03-22

## 2021-03-22 RX ORDER — GABAPENTIN 300 MG/1
CAPSULE ORAL
Qty: 14 CAPSULE | Refills: 0 | Status: SHIPPED | OUTPATIENT
Start: 2021-03-22 | End: 2021-09-22

## 2021-03-22 NOTE — PROGRESS NOTES
"    Mauricio Jacome is a 60 y.o. female.     60-year-old obese white female with history of hyperlipidemia, type 2 diabetes, intermittent migraines, fatigue, dysphagia, depression anxiety, plantar fasciitis, peripheral neuropathy and insomnia who comes in today for follow-up visit  Blood pressure 158/82 heart rate 70 she denies any chest pain, dyspnea, tachycardia or dizziness I am going to increase her hydrochlorothiazide 25 mg and try to bump her pressure down just a little  She complains of not being able to exercise due to burning in her left foot.  She does get injections for plantar fasciitis but I think part of this is also peripheral neuropathy we will going to try a trial of gabapentin to see if that helps with the burning sensation patient states blood sugars are normally under 140 her last fasting blood sugar was 126 A1c 6.0 triglycerides 190 weight is up 1 pound at 219 with a BMI of 35.9.  Once again patient having a hard time exercising due to foot pain.  She states is been a while since she has had injections by podiatry and she is going to give him a call  Patient needs to schedule an eye exam she is up-to-date on mammogram DEXA scan and colonoscopy and she has had her first Covid vaccine  Fasting blood work    Make appointment with podiatry  Fasting blood work today  Gabapentin 300 mg 2 at bedtime  Change lisinopril/hydrochlorothiazide to 20/25 mg daily and monitor blood pressure with parameters given  Follow-up 6 months fasting           The following portions of the patient's history were reviewed and updated as appropriate: allergies, current medications, past family history, past medical history, past social history, past surgical history and problem list.    Vitals:    03/22/21 0913 03/22/21 0919   BP: 159/82 160/79   Pulse: 71    Temp: 96 °F (35.6 °C)    TempSrc: Infrared    SpO2: 99%    Weight: 99.3 kg (219 lb)    Height: 166.4 cm (65.5\")      Body mass index is 35.89 kg/m².    Past Medical " History:   Diagnosis Date   • Diabetes mellitus (CMS/HCC)    • Hyperlipidemia    • Hypertension    • Obesity      Past Surgical History:   Procedure Laterality Date   • APPENDECTOMY     • CHOLECYSTECTOMY     • HYSTERECTOMY       Family History   Problem Relation Age of Onset   • Heart failure Mother    • Multiple myeloma Mother    • Kidney disease Father    • Dementia Father    • Diabetes Father    • Heart disease Father      Immunization History   Administered Date(s) Administered   • COVID-19 (MODERNA) 03/16/2021   • Flulaval/Fluarix/Fluzone Quad 09/22/2020   • Influenza Quad Vaccine (Inpatient) 12/01/2014   • Influenza, Unspecified 10/12/2018   • flucelvax quad pfs =>4 YRS 12/02/2019       Office Visit on 12/09/2020   Component Date Value Ref Range Status   • Rapid Influenza A Ag 12/09/2020 Negative  Negative Final   • Rapid Influenza B Ag 12/09/2020 Negative  Negative Final   • Internal Control 12/09/2020 Passed  Passed Final   • Lot Number 12/09/2020 [id number]   Final   • Expiration Date 12/09/2020 12/31/2021   Final   • SARS-CoV-2, OFELIA 12/09/2020 Not Detected  Not Detected Final    Comment: This nucleic acid amplification test was developed and its performance  characteristics determined by [employer]. Nucleic acid  amplification tests include PCR and TMA. This test has not been FDA  cleared or approved. This test has been authorized by FDA under an  Emergency Use Authorization (EUA). This test is only authorized for  the duration of time the declaration that circumstances exist  justifying the authorization of the emergency use of in vitro  diagnostic tests for detection of SARS-CoV-2 virus and/or diagnosis  of COVID-19 infection under section 564(b)(1) of the Act, 21 U.S.C.  360bbb-3(b) (1), unless the authorization is terminated or revoked  sooner.  When diagnostic testing is negative, the possibility of a false  negative result should be considered in the context of a patient's  recent exposures and  the presence of clinical signs and symptoms  consistent with COVID-19. An individual without symptoms of COVID-19  and who is not shedding SARS-CoV-2 virus would                            expect to have a  negative (not detected) result in this assay.           Review of Systems   Constitutional: Negative.    HENT: Negative.    Respiratory: Negative.    Cardiovascular: Negative.    Gastrointestinal: Negative.    Genitourinary: Negative.    Musculoskeletal: Negative.    Skin: Negative.    Neurological:        Left foot burning   Psychiatric/Behavioral: Negative.        Objective   Physical Exam  Constitutional:       Appearance: Normal appearance.   HENT:      Head: Normocephalic.   Cardiovascular:      Rate and Rhythm: Normal rate.      Pulses: Normal pulses.      Heart sounds: Normal heart sounds.   Pulmonary:      Effort: Pulmonary effort is normal.   Abdominal:      General: Bowel sounds are normal.   Musculoskeletal:      Cervical back: Normal range of motion.   Skin:     General: Skin is warm and dry.   Neurological:      General: No focal deficit present.      Mental Status: She is oriented to person, place, and time.   Psychiatric:         Mood and Affect: Mood normal.         Behavior: Behavior normal.         Procedures    Assessment/Plan   Diagnoses and all orders for this visit:    1. Type 2 diabetes mellitus without complication, without long-term current use of insulin (CMS/[facility]) (Primary)  -     Comprehensive Metabolic Panel  -     Hemoglobin A1c    2. Mixed hyperlipidemia  -     Lipid Panel With LDL / HDL Ratio    3. Preventative health care  -     CBC & Differential    4. Essential hypertension    5. Plantar fasciitis of left foot    6. Other inflammatory polyneuropathies (CMS/[facility])    7. Class 2 severe obesity due to excess calories with serious comorbidity and body mass index (BMI) of 35.0 to 35.9 in adult (CMS/[facility])    Other orders  -     lisinopril-hydrochlorothiazide (PRINZIDE,ZESTORETIC) 20-25 MG  per tablet; Take 1 tablet by mouth Daily.  Dispense: 90 tablet; Refill: 1  -     pravastatin (Pravachol) 20 MG tablet; Take 1 tablet by mouth Daily.  Dispense: 90 tablet; Refill: 1  -     gabapentin (NEURONTIN) 300 MG capsule; 1-2 capsules 30 minutes before bedtime  Dispense: 14 capsule; Refill: 0          Current Outpatient Medications:   •  atenolol (TENORMIN) 50 MG tablet, Take 1 tablet by mouth Daily., Disp: 90 tablet, Rfl: 2  •  metFORMIN (GLUCOPHAGE) 500 MG tablet, TAKE 1 TABLET BY MOUTH TWICE A DAY WITH MEALS, Disp: 180 tablet, Rfl: 1  •  omeprazole (priLOSEC) 40 MG capsule, Take 40 mg by mouth Daily As Needed., Disp: , Rfl:   •  gabapentin (NEURONTIN) 300 MG capsule, 1-2 capsules 30 minutes before bedtime, Disp: 14 capsule, Rfl: 0  •  lisinopril-hydrochlorothiazide (PRINZIDE,ZESTORETIC) 20-25 MG per tablet, Take 1 tablet by mouth Daily., Disp: 90 tablet, Rfl: 1  •  pravastatin (Pravachol) 20 MG tablet, Take 1 tablet by mouth Daily., Disp: 90 tablet, Rfl: 1  •  sucralfate (Carafate) 1 g tablet, Take 1 tablet by mouth 2 (Two) Times a Day As Needed (gerd)., Disp: 30 tablet, Rfl: 0

## 2021-03-22 NOTE — PATIENT INSTRUCTIONS
Fasting blood work today  Make appointment with podiatry  Gabapentin 300 mg 2 at bedtime  Change blood pressure medicine as directed monitor blood pressures with parameters given  Follow-up 6 months fasting

## 2021-03-23 LAB
ALBUMIN SERPL-MCNC: 4.5 G/DL (ref 3.8–4.8)
ALBUMIN/GLOB SERPL: 1.6 {RATIO} (ref 1.2–2.2)
ALP SERPL-CCNC: 91 IU/L (ref 39–117)
ALT SERPL-CCNC: 36 IU/L (ref 0–32)
AST SERPL-CCNC: 34 IU/L (ref 0–40)
BASOPHILS # BLD AUTO: 0 X10E3/UL (ref 0–0.2)
BASOPHILS NFR BLD AUTO: 1 %
BILIRUB SERPL-MCNC: 0.8 MG/DL (ref 0–1.2)
BUN SERPL-MCNC: 12 MG/DL (ref 8–27)
BUN/CREAT SERPL: 15 (ref 12–28)
CALCIUM SERPL-MCNC: 10 MG/DL (ref 8.7–10.3)
CHLORIDE SERPL-SCNC: 102 MMOL/L (ref 96–106)
CHOLEST SERPL-MCNC: 168 MG/DL (ref 100–199)
CO2 SERPL-SCNC: 26 MMOL/L (ref 20–29)
CREAT SERPL-MCNC: 0.78 MG/DL (ref 0.57–1)
EOSINOPHIL # BLD AUTO: 0.2 X10E3/UL (ref 0–0.4)
EOSINOPHIL NFR BLD AUTO: 3 %
ERYTHROCYTE [DISTWIDTH] IN BLOOD BY AUTOMATED COUNT: 12.9 % (ref 11.7–15.4)
GLOBULIN SER CALC-MCNC: 2.8 G/DL (ref 1.5–4.5)
GLUCOSE SERPL-MCNC: 130 MG/DL (ref 65–99)
HBA1C MFR BLD: 6 % (ref 4.8–5.6)
HCT VFR BLD AUTO: 41.6 % (ref 34–46.6)
HDLC SERPL-MCNC: 46 MG/DL
HGB BLD-MCNC: 14.5 G/DL (ref 11.1–15.9)
IMM GRANULOCYTES # BLD AUTO: 0 X10E3/UL (ref 0–0.1)
IMM GRANULOCYTES NFR BLD AUTO: 0 %
LDLC SERPL CALC-MCNC: 85 MG/DL (ref 0–99)
LDLC/HDLC SERPL: 1.8 RATIO (ref 0–3.2)
LYMPHOCYTES # BLD AUTO: 2.7 X10E3/UL (ref 0.7–3.1)
LYMPHOCYTES NFR BLD AUTO: 36 %
MCH RBC QN AUTO: 31 PG (ref 26.6–33)
MCHC RBC AUTO-ENTMCNC: 34.9 G/DL (ref 31.5–35.7)
MCV RBC AUTO: 89 FL (ref 79–97)
MONOCYTES # BLD AUTO: 0.5 X10E3/UL (ref 0.1–0.9)
MONOCYTES NFR BLD AUTO: 7 %
NEUTROPHILS # BLD AUTO: 4 X10E3/UL (ref 1.4–7)
NEUTROPHILS NFR BLD AUTO: 53 %
PLATELET # BLD AUTO: 224 X10E3/UL (ref 150–450)
POTASSIUM SERPL-SCNC: 4 MMOL/L (ref 3.5–5.2)
PROT SERPL-MCNC: 7.3 G/DL (ref 6–8.5)
RBC # BLD AUTO: 4.67 X10E6/UL (ref 3.77–5.28)
SODIUM SERPL-SCNC: 141 MMOL/L (ref 134–144)
TRIGL SERPL-MCNC: 219 MG/DL (ref 0–149)
VLDLC SERPL CALC-MCNC: 37 MG/DL (ref 5–40)
WBC # BLD AUTO: 7.5 X10E3/UL (ref 3.4–10.8)

## 2021-06-16 RX ORDER — ATENOLOL 50 MG/1
TABLET ORAL
Qty: 90 TABLET | Refills: 2 | Status: SHIPPED | OUTPATIENT
Start: 2021-06-16 | End: 2022-03-14

## 2021-06-16 RX ORDER — ROSUVASTATIN CALCIUM 20 MG/1
TABLET, COATED ORAL
Qty: 90 TABLET | Refills: 2 | Status: SHIPPED | OUTPATIENT
Start: 2021-06-16 | End: 2022-03-07

## 2021-06-26 RX ORDER — LISINOPRIL AND HYDROCHLOROTHIAZIDE 20; 12.5 MG/1; MG/1
TABLET ORAL
Qty: 90 TABLET | Refills: 2 | Status: SHIPPED | OUTPATIENT
Start: 2021-06-26 | End: 2021-09-22

## 2021-09-22 ENCOUNTER — OFFICE VISIT (OUTPATIENT)
Dept: FAMILY MEDICINE CLINIC | Facility: CLINIC | Age: 61
End: 2021-09-22

## 2021-09-22 VITALS
HEIGHT: 66 IN | DIASTOLIC BLOOD PRESSURE: 81 MMHG | SYSTOLIC BLOOD PRESSURE: 138 MMHG | HEART RATE: 79 BPM | TEMPERATURE: 97.9 F | OXYGEN SATURATION: 97 % | BODY MASS INDEX: 35.45 KG/M2 | WEIGHT: 220.6 LBS

## 2021-09-22 DIAGNOSIS — E11.9 TYPE 2 DIABETES MELLITUS WITHOUT COMPLICATION, WITHOUT LONG-TERM CURRENT USE OF INSULIN (HCC): ICD-10-CM

## 2021-09-22 DIAGNOSIS — E78.2 MIXED HYPERLIPIDEMIA: ICD-10-CM

## 2021-09-22 DIAGNOSIS — Z78.0 POST-MENOPAUSAL: ICD-10-CM

## 2021-09-22 DIAGNOSIS — Z12.31 OTHER SCREENING MAMMOGRAM: Primary | ICD-10-CM

## 2021-09-22 DIAGNOSIS — Z00.00 PREVENTATIVE HEALTH CARE: ICD-10-CM

## 2021-09-22 DIAGNOSIS — E66.09 CLASS 2 OBESITY DUE TO EXCESS CALORIES WITHOUT SERIOUS COMORBIDITY WITH BODY MASS INDEX (BMI) OF 36.0 TO 36.9 IN ADULT: ICD-10-CM

## 2021-09-22 PROBLEM — E66.01 CLASS 2 SEVERE OBESITY DUE TO EXCESS CALORIES WITH SERIOUS COMORBIDITY AND BODY MASS INDEX (BMI) OF 35.0 TO 35.9 IN ADULT: Status: RESOLVED | Noted: 2021-03-22 | Resolved: 2021-09-22

## 2021-09-22 PROBLEM — E66.812 CLASS 2 OBESITY DUE TO EXCESS CALORIES WITHOUT SERIOUS COMORBIDITY WITH BODY MASS INDEX (BMI) OF 36.0 TO 36.9 IN ADULT: Status: ACTIVE | Noted: 2021-09-22

## 2021-09-22 PROBLEM — E66.812 CLASS 2 SEVERE OBESITY DUE TO EXCESS CALORIES WITH SERIOUS COMORBIDITY AND BODY MASS INDEX (BMI) OF 35.0 TO 35.9 IN ADULT: Status: RESOLVED | Noted: 2021-03-22 | Resolved: 2021-09-22

## 2021-09-22 PROCEDURE — 99213 OFFICE O/P EST LOW 20 MIN: CPT | Performed by: NURSE PRACTITIONER

## 2021-09-22 RX ORDER — LISINOPRIL AND HYDROCHLOROTHIAZIDE 25; 20 MG/1; MG/1
1 TABLET ORAL DAILY
Qty: 90 TABLET | Refills: 1 | Status: SHIPPED | OUTPATIENT
Start: 2021-09-22 | End: 2022-06-15

## 2021-09-22 RX ORDER — GABAPENTIN 300 MG/1
CAPSULE ORAL
Qty: 14 CAPSULE | Refills: 0 | Status: SHIPPED | OUTPATIENT
Start: 2021-09-22 | End: 2022-03-22

## 2021-09-22 NOTE — PROGRESS NOTES
Mauricio Jacome is a 61 y.o. female.     61-year-old obese white female with history of hyperlipidemia, type 2 diabetes, intermittent migraines, fatigue, dysphagia, depression anxiety, plantar fasciitis, peripheral neuropathy and insomnia who comes in today for 6-month follow-up visit  Blood pressure 138/80 heart rate 78 she denies any chest pain, dyspnea, tachycardia or dizziness    Patient states blood sugars are normally under 140 fasting her last blood sugar was 130 A1c 6.0 triglycerides 219  Patient does have some mild liver enzyme elevation probably due to fatty liver and we discussed this during our visit    On last visit I placed patient on gabapentin for sleep and for her peripheral neuropathy but she states the pharmacy said it was never called in.  I am reordering that for her    We had lengthy discussion on diet and exercise.  Patient's current weight is 221 with a BMI of 36.2.  And I explained to patient that if she lost 20 to 30 pounds she could probably get off the Jorde of her medication.  Patient has been well from online try keto diet and do a 24-hour calorie count.  Patient also does not exercise very much and is going to try to start walking again    Patient up-to-date on Covid vaccines eye exam and colonoscopy.  She does not do Pap smears due to hysterectomy and I am ordering her mammogram bone scan           Mammogram/DEXA scan  Fasting blood work  Diet and exercise as discussed  Gabapentin 30 mg 1-2 nightly  Follow-up 6 months       The following portions of the patient's history were reviewed and updated as appropriate: allergies, current medications, past family history, past medical history, past social history, past surgical history and problem list.    Vitals:    09/22/21 0909   BP: 138/81   BP Location: Right arm   Patient Position: Sitting   Cuff Size: Large Adult   Pulse: 79   Temp: 97.9 °F (36.6 °C)   TempSrc: Temporal   SpO2: 97%   Weight: 100 kg (220 lb 9.6 oz)   Height: 166.4  "cm (65.5\")     Body mass index is 36.15 kg/m².    Past Medical History:   Diagnosis Date   • Diabetes mellitus (CMS/HCC)    • Hyperlipidemia    • Hypertension    • Obesity      Past Surgical History:   Procedure Laterality Date   • APPENDECTOMY     • CHOLECYSTECTOMY     • HYSTERECTOMY       Family History   Problem Relation Age of Onset   • Heart failure Mother    • Multiple myeloma Mother    • Kidney disease Father    • Dementia Father    • Diabetes Father    • Heart disease Father      Immunization History   Administered Date(s) Administered   • COVID-19 (MODERNA) 03/16/2021, 04/16/2021   • FluLaval/Fluarix (VFC) >6 Months 09/22/2020   • Influenza Quad Vaccine (Inpatient) 12/01/2014   • Influenza, Unspecified 10/12/2018   • flucelvax quad pfs =>4 YRS 12/02/2019       Office Visit on 03/22/2021   Component Date Value Ref Range Status   • WBC 03/22/2021 7.5  3.4 - 10.8 x10E3/uL Final   • RBC 03/22/2021 4.67  3.77 - 5.28 x10E6/uL Final   • Hemoglobin 03/22/2021 14.5  11.1 - 15.9 g/dL Final   • Hematocrit 03/22/2021 41.6  34.0 - 46.6 % Final   • MCV 03/22/2021 89  79 - 97 fL Final   • MCH 03/22/2021 31.0  26.6 - 33.0 pg Final   • MCHC 03/22/2021 34.9  31.5 - 35.7 g/dL Final   • RDW 03/22/2021 12.9  11.7 - 15.4 % Final   • Platelets 03/22/2021 224  150 - 450 x10E3/uL Final   • Neutrophil Rel % 03/22/2021 53  Not Estab. % Final   • Lymphocyte Rel % 03/22/2021 36  Not Estab. % Final   • Monocyte Rel % 03/22/2021 7  Not Estab. % Final   • Eosinophil Rel % 03/22/2021 3  Not Estab. % Final   • Basophil Rel % 03/22/2021 1  Not Estab. % Final   • Neutrophils Absolute 03/22/2021 4.0  1.4 - 7.0 x10E3/uL Final   • Lymphocytes Absolute 03/22/2021 2.7  0.7 - 3.1 x10E3/uL Final   • Monocytes Absolute 03/22/2021 0.5  0.1 - 0.9 x10E3/uL Final   • Eosinophils Absolute 03/22/2021 0.2  0.0 - 0.4 x10E3/uL Final   • Basophils Absolute 03/22/2021 0.0  0.0 - 0.2 x10E3/uL Final   • Immature Granulocyte Rel % 03/22/2021 0  Not Estab. % Final "   • Immature Grans Absolute 03/22/2021 0.0  0.0 - 0.1 x10E3/uL Final   • Glucose 03/22/2021 130* 65 - 99 mg/dL Final   • BUN 03/22/2021 12  8 - 27 mg/dL Final   • Creatinine 03/22/2021 0.78  0.57 - 1.00 mg/dL Final   • eGFR Non  Am 03/22/2021 82  >59 mL/min/1.73 Final   • eGFR African Am 03/22/2021 95  >59 mL/min/1.73 Final   • BUN/Creatinine Ratio 03/22/2021 15  12 - 28 Final   • Sodium 03/22/2021 141  134 - 144 mmol/L Final   • Potassium 03/22/2021 4.0  3.5 - 5.2 mmol/L Final   • Chloride 03/22/2021 102  96 - 106 mmol/L Final   • Total CO2 03/22/2021 26  20 - 29 mmol/L Final   • Calcium 03/22/2021 10.0  8.7 - 10.3 mg/dL Final   • Total Protein 03/22/2021 7.3  6.0 - 8.5 g/dL Final   • Albumin 03/22/2021 4.5  3.8 - 4.8 g/dL Final   • Globulin 03/22/2021 2.8  1.5 - 4.5 g/dL Final   • A/G Ratio 03/22/2021 1.6  1.2 - 2.2 Final   • Total Bilirubin 03/22/2021 0.8  0.0 - 1.2 mg/dL Final   • Alkaline Phosphatase 03/22/2021 91  39 - 117 IU/L Final   • AST (SGOT) 03/22/2021 34  0 - 40 IU/L Final   • ALT (SGPT) 03/22/2021 36* 0 - 32 IU/L Final   • Hemoglobin A1C 03/22/2021 6.0* 4.8 - 5.6 % Final    Comment:          Prediabetes: 5.7 - 6.4           Diabetes: >6.4           Glycemic control for adults with diabetes: <7.0     • Total Cholesterol 03/22/2021 168  100 - 199 mg/dL Final   • Triglycerides 03/22/2021 219* 0 - 149 mg/dL Final   • HDL Cholesterol 03/22/2021 46  >39 mg/dL Final   • VLDL Cholesterol Obie 03/22/2021 37  5 - 40 mg/dL Final   • LDL Chol Calc ([facility]) 03/22/2021 85  0 - 99 mg/dL Final   • LDL/HDL RATIO 03/22/2021 1.8  0.0 - 3.2 ratio Final    Comment:                                     LDL/HDL Ratio                                              Men  Women                                1/2 Avg.Risk  1.0    1.5                                    Avg.Risk  3.6    3.2                                 2X Avg.Risk  6.2    5.0                                 3X Avg.Risk  8.0    6.1           Review of Systems    Constitutional: Negative.    HENT: Negative.    Respiratory: Negative.    Cardiovascular: Negative.    Gastrointestinal: Negative.    Genitourinary: Negative.    Musculoskeletal: Negative.    Skin: Negative.    Neurological: Negative.    Psychiatric/Behavioral: Negative.        Objective   Physical Exam  Constitutional:       Appearance: Normal appearance.   HENT:      Head: Normocephalic.   Cardiovascular:      Rate and Rhythm: Normal rate and regular rhythm.      Pulses: Normal pulses.   Pulmonary:      Effort: Pulmonary effort is normal.      Breath sounds: Normal breath sounds.   Abdominal:      General: Bowel sounds are normal.   Musculoskeletal:         General: Normal range of motion.   Skin:     General: Skin is warm and dry.   Neurological:      General: No focal deficit present.      Mental Status: She is alert and oriented to person, place, and time.   Psychiatric:         Mood and Affect: Mood normal.         Behavior: Behavior normal.         Procedures    Assessment/Plan   Diagnoses and all orders for this visit:    1. Other screening mammogram (Primary)  -     Cancel: Mammo Screening Digital Tomosynthesis Bilateral With CAD; Future  -     Mammo Screening Digital Tomosynthesis Bilateral With CAD; Future    2. Post-menopausal  -     Cancel: DEXA Bone Density Axial; Future  -     DEXA Bone Density Axial; Future    3. Mixed hyperlipidemia  -     Lipid Panel With LDL / HDL Ratio    4. Type 2 diabetes mellitus without complication, without long-term current use of insulin (CMS/[facility])  -     Comprehensive Metabolic Panel  -     Hemoglobin A1c    5. Preventative health care  -     CBC & Differential    6. Class 2 obesity due to excess calories without serious comorbidity with body mass index (BMI) of 36.0 to 36.9 in adult    Other orders  -     gabapentin (NEURONTIN) 300 MG capsule; 1-2 capsules 30 minutes before bedtime  Dispense: 14 capsule; Refill: 0  -     lisinopril-hydrochlorothiazide  (PRINZIDE,ZESTORETIC) 20-25 MG per tablet; Take 1 tablet by mouth Daily.  Dispense: 90 tablet; Refill: 1          Current Outpatient Medications:   •  atenolol (TENORMIN) 50 MG tablet, TAKE 1 TABLET BY MOUTH EVERY DAY, Disp: 90 tablet, Rfl: 2  •  gabapentin (NEURONTIN) 300 MG capsule, 1-2 capsules 30 minutes before bedtime, Disp: 14 capsule, Rfl: 0  •  lisinopril-hydrochlorothiazide (PRINZIDE,ZESTORETIC) 20-25 MG per tablet, Take 1 tablet by mouth Daily., Disp: 90 tablet, Rfl: 1  •  metFORMIN (GLUCOPHAGE) 500 MG tablet, TAKE 1 TABLET BY MOUTH TWICE A DAY WITH MEALS, Disp: 180 tablet, Rfl: 1  •  omeprazole (priLOSEC) 40 MG capsule, Take 40 mg by mouth Daily As Needed., Disp: , Rfl:   •  pravastatin (Pravachol) 20 MG tablet, Take 1 tablet by mouth Daily., Disp: 90 tablet, Rfl: 1  •  rosuvastatin (CRESTOR) 20 MG tablet, TAKE 1 TABLET BY MOUTH EVERYDAY AT BEDTIME, Disp: 90 tablet, Rfl: 2  •  sucralfate (Carafate) 1 g tablet, Take 1 tablet by mouth 2 (Two) Times a Day As Needed (gerd)., Disp: 30 tablet, Rfl: 0

## 2021-09-22 NOTE — PATIENT INSTRUCTIONS
Mammogram/DEXA scan  Fasting blood work  Diet and exercise as discussed  Gabapentin 30 mg 1-2 nightly  Follow-up 6 months

## 2021-09-23 LAB
ALBUMIN SERPL-MCNC: 4.7 G/DL (ref 3.8–4.8)
ALBUMIN/GLOB SERPL: 1.6 {RATIO} (ref 1.2–2.2)
ALP SERPL-CCNC: 87 IU/L (ref 44–121)
ALT SERPL-CCNC: 37 IU/L (ref 0–32)
AST SERPL-CCNC: 32 IU/L (ref 0–40)
BASOPHILS # BLD AUTO: 0.1 X10E3/UL (ref 0–0.2)
BASOPHILS NFR BLD AUTO: 1 %
BILIRUB SERPL-MCNC: 0.9 MG/DL (ref 0–1.2)
BUN SERPL-MCNC: 12 MG/DL (ref 8–27)
BUN/CREAT SERPL: 14 (ref 12–28)
CALCIUM SERPL-MCNC: 9.9 MG/DL (ref 8.7–10.3)
CHLORIDE SERPL-SCNC: 101 MMOL/L (ref 96–106)
CHOLEST SERPL-MCNC: 160 MG/DL (ref 100–199)
CO2 SERPL-SCNC: 25 MMOL/L (ref 20–29)
CREAT SERPL-MCNC: 0.85 MG/DL (ref 0.57–1)
EOSINOPHIL # BLD AUTO: 0.1 X10E3/UL (ref 0–0.4)
EOSINOPHIL NFR BLD AUTO: 1 %
ERYTHROCYTE [DISTWIDTH] IN BLOOD BY AUTOMATED COUNT: 12.5 % (ref 11.7–15.4)
GLOBULIN SER CALC-MCNC: 2.9 G/DL (ref 1.5–4.5)
GLUCOSE SERPL-MCNC: 124 MG/DL (ref 65–99)
HBA1C MFR BLD: 6.4 % (ref 4.8–5.6)
HCT VFR BLD AUTO: 44.4 % (ref 34–46.6)
HDLC SERPL-MCNC: 51 MG/DL
HGB BLD-MCNC: 15 G/DL (ref 11.1–15.9)
IMM GRANULOCYTES # BLD AUTO: 0 X10E3/UL (ref 0–0.1)
IMM GRANULOCYTES NFR BLD AUTO: 0 %
LDLC SERPL CALC-MCNC: 78 MG/DL (ref 0–99)
LDLC/HDLC SERPL: 1.5 RATIO (ref 0–3.2)
LYMPHOCYTES # BLD AUTO: 3.3 X10E3/UL (ref 0.7–3.1)
LYMPHOCYTES NFR BLD AUTO: 29 %
MCH RBC QN AUTO: 30.4 PG (ref 26.6–33)
MCHC RBC AUTO-ENTMCNC: 33.8 G/DL (ref 31.5–35.7)
MCV RBC AUTO: 90 FL (ref 79–97)
MONOCYTES # BLD AUTO: 0.7 X10E3/UL (ref 0.1–0.9)
MONOCYTES NFR BLD AUTO: 6 %
NEUTROPHILS # BLD AUTO: 7.2 X10E3/UL (ref 1.4–7)
NEUTROPHILS NFR BLD AUTO: 63 %
PLATELET # BLD AUTO: 232 X10E3/UL (ref 150–450)
POTASSIUM SERPL-SCNC: 4.2 MMOL/L (ref 3.5–5.2)
PROT SERPL-MCNC: 7.6 G/DL (ref 6–8.5)
RBC # BLD AUTO: 4.94 X10E6/UL (ref 3.77–5.28)
SODIUM SERPL-SCNC: 142 MMOL/L (ref 134–144)
TRIGL SERPL-MCNC: 184 MG/DL (ref 0–149)
VLDLC SERPL CALC-MCNC: 31 MG/DL (ref 5–40)
WBC # BLD AUTO: 11.3 X10E3/UL (ref 3.4–10.8)

## 2021-10-22 ENCOUNTER — TELEPHONE (OUTPATIENT)
Dept: FAMILY MEDICINE CLINIC | Facility: CLINIC | Age: 61
End: 2021-10-22

## 2021-10-22 DIAGNOSIS — D72.829 LEUKOCYTOSIS, UNSPECIFIED TYPE: Primary | ICD-10-CM

## 2021-10-26 LAB
BASOPHILS # BLD AUTO: 0 X10E3/UL (ref 0–0.2)
BASOPHILS NFR BLD AUTO: 0 %
EOSINOPHIL # BLD AUTO: 0.1 X10E3/UL (ref 0–0.4)
EOSINOPHIL NFR BLD AUTO: 1 %
ERYTHROCYTE [DISTWIDTH] IN BLOOD BY AUTOMATED COUNT: 12 % (ref 11.7–15.4)
HCT VFR BLD AUTO: 42.7 % (ref 34–46.6)
HGB BLD-MCNC: 14.8 G/DL (ref 11.1–15.9)
IMM GRANULOCYTES # BLD AUTO: 0.1 X10E3/UL (ref 0–0.1)
IMM GRANULOCYTES NFR BLD AUTO: 1 %
LYMPHOCYTES # BLD AUTO: 2.9 X10E3/UL (ref 0.7–3.1)
LYMPHOCYTES NFR BLD AUTO: 28 %
MCH RBC QN AUTO: 31.5 PG (ref 26.6–33)
MCHC RBC AUTO-ENTMCNC: 34.7 G/DL (ref 31.5–35.7)
MCV RBC AUTO: 91 FL (ref 79–97)
MONOCYTES # BLD AUTO: 0.6 X10E3/UL (ref 0.1–0.9)
MONOCYTES NFR BLD AUTO: 6 %
NEUTROPHILS # BLD AUTO: 6.7 X10E3/UL (ref 1.4–7)
NEUTROPHILS NFR BLD AUTO: 64 %
PLATELET # BLD AUTO: 224 X10E3/UL (ref 150–450)
RBC # BLD AUTO: 4.7 X10E6/UL (ref 3.77–5.28)
WBC # BLD AUTO: 10.3 X10E3/UL (ref 3.4–10.8)

## 2021-10-29 ENCOUNTER — TELEPHONE (OUTPATIENT)
Dept: FAMILY MEDICINE CLINIC | Facility: CLINIC | Age: 61
End: 2021-10-29

## 2021-10-29 NOTE — TELEPHONE ENCOUNTER
Caller: Mauricio Jacome    Relationship: Self    Best call back number: [phone]    Caller requesting test results: SELF    What test was performed: BLOOD WORK    When was the test performed: Monday 11/22/21    Where was the test performed: OFFICE     Additional notes: PLEASE CALL PATIENT TO DISCUSS THE RESULTS WHEN THEY COME IN.

## 2021-12-13 ENCOUNTER — HOSPITAL ENCOUNTER (OUTPATIENT)
Dept: BONE DENSITY | Facility: HOSPITAL | Age: 61
Discharge: HOME | End: 2021-12-13

## 2021-12-13 ENCOUNTER — APPOINTMENT (OUTPATIENT)
Dept: OTHER | Facility: HOSPITAL | Age: 61
End: 2021-12-13

## 2021-12-13 ENCOUNTER — HOSPITAL ENCOUNTER (OUTPATIENT)
Dept: MAMMOGRAPHY | Facility: HOSPITAL | Age: 61
Discharge: HOME | End: 2021-12-13

## 2021-12-13 DIAGNOSIS — Z78.0 POST-MENOPAUSAL: ICD-10-CM

## 2021-12-13 DIAGNOSIS — Z09 FOLLOW UP: ICD-10-CM

## 2021-12-13 DIAGNOSIS — Z12.31 OTHER SCREENING MAMMOGRAM: ICD-10-CM

## 2021-12-13 PROCEDURE — 77063 BREAST TOMOSYNTHESIS BI: CPT

## 2021-12-13 PROCEDURE — 77067 SCR MAMMO BI INCL CAD: CPT

## 2021-12-13 PROCEDURE — 77080 DXA BONE DENSITY AXIAL: CPT

## 2022-03-07 RX ORDER — ROSUVASTATIN CALCIUM 20 MG/1
TABLET, COATED ORAL
Qty: 90 TABLET | Refills: 1 | Status: SHIPPED | OUTPATIENT
Start: 2022-03-07 | End: 2022-08-31

## 2022-03-14 RX ORDER — ATENOLOL 50 MG/1
TABLET ORAL
Qty: 90 TABLET | Refills: 1 | Status: SHIPPED | OUTPATIENT
Start: 2022-03-14 | End: 2022-09-07

## 2022-03-22 ENCOUNTER — OFFICE VISIT (OUTPATIENT)
Dept: FAMILY MEDICINE CLINIC | Facility: CLINIC | Age: 62
End: 2022-03-22

## 2022-03-22 VITALS
BODY MASS INDEX: 35.29 KG/M2 | HEART RATE: 81 BPM | TEMPERATURE: 97.1 F | OXYGEN SATURATION: 95 % | DIASTOLIC BLOOD PRESSURE: 79 MMHG | HEIGHT: 66 IN | WEIGHT: 219.6 LBS | SYSTOLIC BLOOD PRESSURE: 138 MMHG

## 2022-03-22 DIAGNOSIS — E66.09 CLASS 2 OBESITY DUE TO EXCESS CALORIES WITHOUT SERIOUS COMORBIDITY WITH BODY MASS INDEX (BMI) OF 36.0 TO 36.9 IN ADULT: ICD-10-CM

## 2022-03-22 DIAGNOSIS — E11.9 TYPE 2 DIABETES MELLITUS WITHOUT COMPLICATION, WITHOUT LONG-TERM CURRENT USE OF INSULIN: Primary | ICD-10-CM

## 2022-03-22 DIAGNOSIS — E78.2 MIXED HYPERLIPIDEMIA: ICD-10-CM

## 2022-03-22 PROCEDURE — 99213 OFFICE O/P EST LOW 20 MIN: CPT | Performed by: NURSE PRACTITIONER

## 2022-03-22 NOTE — PROGRESS NOTES
"    Mauricio Jacome is a 61 y.o. female.     61-year-old obese white female with history of hyperlipidemia, type 2 diabetes, intermittent migraines, fatigue, dysphagia, depression anxiety, plantar fasciitis, peripheral neuropathy and insomnia who comes in today for 6-month follow-up visit and fasting blood work    Blood pressure 138/78 heart rate 80 she denies any chest pain, dyspnea, tachycardia or dizziness    Patient states blood sugars normally are in the 130s fasting.  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  She states she does not want to take anything at this point    Patient weight is 225 BMI 36.  She is up-to-date on 3 COVID vaccines eye exam mammogram bone scan and her next colonoscopy is due in 2030          Fasting blood work  Diet and exercise  Follow-up 6 months fasting       The following portions of the patient's history were reviewed and updated as appropriate: allergies, current medications, past family history, past medical history, past social history, past surgical history and problem list.    Vitals:    03/22/22 0805   BP: 138/79   BP Location: Left arm   Patient Position: Sitting   Cuff Size: Large Adult   Pulse: 81   Temp: 97.1 °F (36.2 °C)   TempSrc: Temporal   SpO2: 95%   Weight: 99.6 kg (219 lb 9.6 oz)   Height: 166.4 cm (65.5\")     Body mass index is 35.99 kg/m².    Past Medical History:   Diagnosis Date   • Diabetes mellitus (HCC)    • Hyperlipidemia    • Hypertension    • Obesity      Past Surgical History:   Procedure Laterality Date   • APPENDECTOMY     • CHOLECYSTECTOMY     • HYSTERECTOMY       Family History   Problem Relation Age of Onset   • Heart failure Mother    • Multiple myeloma Mother    • Kidney disease Father    • Dementia Father    • " Diabetes Father    • Heart disease Father      Immunization History   Administered Date(s) Administered   • COVID-19 (MODERNA) 1st, 2nd, 3rd Dose Only 03/16/2021, 04/13/2021   • COVID-19 (PFIZER) PURPLE CAP 12/18/2021   • FluLaval/Fluarix/Fluzone >6 09/22/2020   • Influenza Quad Vaccine (Inpatient) 12/01/2014   • Influenza, Unspecified 10/12/2018, 12/01/2021   • flucelvax quad pfs =>4 YRS 12/02/2019       Telephone on 10/22/2021   Component Date Value Ref Range Status   • WBC 10/25/2021 10.3  3.4 - 10.8 x10E3/uL Final   • RBC 10/25/2021 4.70  3.77 - 5.28 x10E6/uL Final   • Hemoglobin 10/25/2021 14.8  11.1 - 15.9 g/dL Final   • Hematocrit 10/25/2021 42.7  34.0 - 46.6 % Final   • MCV 10/25/2021 91  79 - 97 fL Final   • MCH 10/25/2021 31.5  26.6 - 33.0 pg Final   • MCHC 10/25/2021 34.7  31.5 - 35.7 g/dL Final   • RDW 10/25/2021 12.0  11.7 - 15.4 % Final   • Platelets 10/25/2021 224  150 - 450 x10E3/uL Final   • Neutrophil Rel % 10/25/2021 64  Not Estab. % Final   • Lymphocyte Rel % 10/25/2021 28  Not Estab. % Final   • Monocyte Rel % 10/25/2021 6  Not Estab. % Final   • Eosinophil Rel % 10/25/2021 1  Not Estab. % Final   • Basophil Rel % 10/25/2021 0  Not Estab. % Final   • Neutrophils Absolute 10/25/2021 6.7  1.4 - 7.0 x10E3/uL Final   • Lymphocytes Absolute 10/25/2021 2.9  0.7 - 3.1 x10E3/uL Final   • Monocytes Absolute 10/25/2021 0.6  0.1 - 0.9 x10E3/uL Final   • Eosinophils Absolute 10/25/2021 0.1  0.0 - 0.4 x10E3/uL Final   • Basophils Absolute 10/25/2021 0.0  0.0 - 0.2 x10E3/uL Final   • Immature Granulocyte Rel % 10/25/2021 1  Not Estab. % Final   • Immature Grans Absolute 10/25/2021 0.1  0.0 - 0.1 x10E3/uL Final         Review of Systems   Constitutional: Negative.    HENT: Negative.    Respiratory: Negative.    Cardiovascular: Negative.    Gastrointestinal: Negative.    Genitourinary: Negative.    Musculoskeletal: Negative.    Neurological: Negative.    Psychiatric/Behavioral: Negative.        Objective    Physical Exam  Constitutional:       Appearance: Normal appearance.   HENT:      Head: Normocephalic.   Cardiovascular:      Rate and Rhythm: Normal rate and regular rhythm.      Pulses: Normal pulses.      Heart sounds: Normal heart sounds.   Pulmonary:      Effort: Pulmonary effort is normal.      Breath sounds: Normal breath sounds.   Abdominal:      General: Bowel sounds are normal.   Musculoskeletal:         General: Normal range of motion.   Skin:     General: Skin is warm and dry.   Neurological:      General: No focal deficit present.      Mental Status: She is alert and oriented to person, place, and time.   Psychiatric:         Mood and Affect: Mood normal.         Behavior: Behavior normal.         Procedures    Assessment/Plan   Diagnoses and all orders for this visit:    1. Type 2 diabetes mellitus without complication, without long-term current use of insulin (HCC) (Primary)  -     Comprehensive Metabolic Panel  -     Hemoglobin A1c    2. Mixed hyperlipidemia  -     Lipid Panel With LDL / HDL Ratio    3. Class 2 obesity due to excess calories without serious comorbidity with body mass index (BMI) of 36.0 to 36.9 in adult          Current Outpatient Medications:   •  atenolol (TENORMIN) 50 MG tablet, TAKE 1 TABLET BY MOUTH EVERY DAY, Disp: 90 tablet, Rfl: 1  •  lisinopril-hydrochlorothiazide (PRINZIDE,ZESTORETIC) 20-25 MG per tablet, Take 1 tablet by mouth Daily., Disp: 90 tablet, Rfl: 1  •  metFORMIN (GLUCOPHAGE) 500 MG tablet, TAKE 1 TABLET BY MOUTH TWICE A DAY WITH MEALS, Disp: 180 tablet, Rfl: 1  •  omeprazole (priLOSEC) 40 MG capsule, Take 40 mg by mouth Daily As Needed., Disp: , Rfl:   •  rosuvastatin (CRESTOR) 20 MG tablet, TAKE 1 TABLET BY MOUTH EVERYDAY AT BEDTIME, Disp: 90 tablet, Rfl: 1  •  sucralfate (Carafate) 1 g tablet, Take 1 tablet by mouth 2 (Two) Times a Day As Needed (gerd)., Disp: 30 tablet, Rfl: 0           WALKER De La Rosa 3/22/2022 08:25 EDT  This note has been  electronically signed

## 2022-03-23 LAB
ALBUMIN SERPL-MCNC: 4.8 G/DL (ref 3.8–4.8)
ALBUMIN/GLOB SERPL: 1.8 {RATIO} (ref 1.2–2.2)
ALP SERPL-CCNC: 82 IU/L (ref 44–121)
ALT SERPL-CCNC: 31 IU/L (ref 0–32)
AST SERPL-CCNC: 30 IU/L (ref 0–40)
BILIRUB SERPL-MCNC: 0.6 MG/DL (ref 0–1.2)
BUN SERPL-MCNC: 11 MG/DL (ref 8–27)
BUN/CREAT SERPL: 13 (ref 12–28)
CALCIUM SERPL-MCNC: 10 MG/DL (ref 8.7–10.3)
CHLORIDE SERPL-SCNC: 99 MMOL/L (ref 96–106)
CHOLEST SERPL-MCNC: 167 MG/DL (ref 100–199)
CO2 SERPL-SCNC: 25 MMOL/L (ref 20–29)
CREAT SERPL-MCNC: 0.82 MG/DL (ref 0.57–1)
EGFRCR SERPLBLD CKD-EPI 2021: 81 ML/MIN/1.73
GLOBULIN SER CALC-MCNC: 2.7 G/DL (ref 1.5–4.5)
GLUCOSE SERPL-MCNC: 144 MG/DL (ref 65–99)
HBA1C MFR BLD: 6 % (ref 4.8–5.6)
HDLC SERPL-MCNC: 52 MG/DL
LDLC SERPL CALC-MCNC: 83 MG/DL (ref 0–99)
LDLC/HDLC SERPL: 1.6 RATIO (ref 0–3.2)
POTASSIUM SERPL-SCNC: 4 MMOL/L (ref 3.5–5.2)
PROT SERPL-MCNC: 7.5 G/DL (ref 6–8.5)
SODIUM SERPL-SCNC: 142 MMOL/L (ref 134–144)
TRIGL SERPL-MCNC: 189 MG/DL (ref 0–149)
VLDLC SERPL CALC-MCNC: 32 MG/DL (ref 5–40)

## 2022-06-15 RX ORDER — LISINOPRIL AND HYDROCHLOROTHIAZIDE 25; 20 MG/1; MG/1
TABLET ORAL
Qty: 90 TABLET | Refills: 1 | Status: SHIPPED | OUTPATIENT
Start: 2022-06-15 | End: 2022-12-06

## 2022-08-31 RX ORDER — ROSUVASTATIN CALCIUM 20 MG/1
TABLET, COATED ORAL
Qty: 90 TABLET | Refills: 1 | Status: SHIPPED | OUTPATIENT
Start: 2022-08-31 | End: 2023-03-22

## 2022-09-07 RX ORDER — ATENOLOL 50 MG/1
TABLET ORAL
Qty: 90 TABLET | Refills: 1 | Status: SHIPPED | OUTPATIENT
Start: 2022-09-07 | End: 2023-03-04

## 2022-09-22 ENCOUNTER — OFFICE VISIT (OUTPATIENT)
Dept: FAMILY MEDICINE CLINIC | Facility: CLINIC | Age: 62
End: 2022-09-22

## 2022-09-22 VITALS
WEIGHT: 212 LBS | HEIGHT: 66 IN | OXYGEN SATURATION: 98 % | HEART RATE: 88 BPM | BODY MASS INDEX: 34.07 KG/M2 | SYSTOLIC BLOOD PRESSURE: 141 MMHG | DIASTOLIC BLOOD PRESSURE: 83 MMHG | TEMPERATURE: 96.9 F

## 2022-09-22 DIAGNOSIS — E11.9 TYPE 2 DIABETES MELLITUS WITHOUT COMPLICATION, WITHOUT LONG-TERM CURRENT USE OF INSULIN: ICD-10-CM

## 2022-09-22 DIAGNOSIS — Z23 NEED FOR INFLUENZA VACCINATION: ICD-10-CM

## 2022-09-22 DIAGNOSIS — E78.2 MIXED HYPERLIPIDEMIA: Primary | ICD-10-CM

## 2022-09-22 DIAGNOSIS — E66.09 CLASS 1 OBESITY DUE TO EXCESS CALORIES WITH SERIOUS COMORBIDITY AND BODY MASS INDEX (BMI) OF 34.0 TO 34.9 IN ADULT: ICD-10-CM

## 2022-09-22 DIAGNOSIS — Z12.31 OTHER SCREENING MAMMOGRAM: ICD-10-CM

## 2022-09-22 DIAGNOSIS — Z23 NEED FOR PNEUMOCOCCAL VACCINATION: ICD-10-CM

## 2022-09-22 PROBLEM — E66.811 CLASS 1 OBESITY DUE TO EXCESS CALORIES WITH SERIOUS COMORBIDITY AND BODY MASS INDEX (BMI) OF 34.0 TO 34.9 IN ADULT: Status: ACTIVE | Noted: 2022-09-22

## 2022-09-22 PROCEDURE — 90686 IIV4 VACC NO PRSV 0.5 ML IM: CPT | Performed by: NURSE PRACTITIONER

## 2022-09-22 PROCEDURE — 90471 IMMUNIZATION ADMIN: CPT | Performed by: NURSE PRACTITIONER

## 2022-09-22 PROCEDURE — 90677 PCV20 VACCINE IM: CPT | Performed by: NURSE PRACTITIONER

## 2022-09-22 PROCEDURE — 99213 OFFICE O/P EST LOW 20 MIN: CPT | Performed by: NURSE PRACTITIONER

## 2022-09-22 NOTE — PROGRESS NOTES
"    Mauricio Jacome is a 62 y.o. female.     History of Present Illness  62-year-old obese white female with history of hyperlipidemia, type 2 diabetes, intermittent migraines, fatigue, dysphagia, depression anxiety, plantar fasciitis, peripheral neuropathy and insomnia who comes in today for 6-month follow-up visit and fasting blood work    Blood pressure 140/82 heart rate 88 she denies any chest pain, dyspnea, tachycardia or dizziness    Blood sugars normally under 160 in the morning.  Her last A1c was 6.0 fasting blood sugar 144 and triglycerides 189.  We will be doing fasting labs today    Patient had COVID over a year ago and still has not gotten her taste back as result she has lost 13 pounds with a weight of 212 and a BMI of 34.7.  She has had 3 COVID vaccines up-to-date on eye exam and DEXA scan.  I am scheduling her mammogram at [place] and her colonoscopy is due in 2030              Fasting blood work  Mammogram at [place]  Continue weight loss  Schedule eye exam this fall  Influenza and pneumonia vaccines today  Follow-up 6 months fasting       The following portions of the patient's history were reviewed and updated as appropriate: allergies, current medications, past family history, past medical history, past social history, past surgical history and problem list.    Vitals:    09/22/22 0935   BP: 141/83   BP Location: Right arm   Patient Position: Sitting   Cuff Size: Large Adult   Pulse: 88   Temp: 96.9 °F (36.1 °C)   TempSrc: Temporal   SpO2: 98%   Weight: 96.2 kg (212 lb)   Height: 166.4 cm (65.5\")     Body mass index is 34.74 kg/m².    Past Medical History:   Diagnosis Date   • Diabetes mellitus (HCC)    • Hyperlipidemia    • Hypertension    • Obesity      Past Surgical History:   Procedure Laterality Date   • APPENDECTOMY     • CHOLECYSTECTOMY     • HYSTERECTOMY       Family History   Problem Relation Age of Onset   • Heart failure Mother    • Multiple myeloma Mother    • Kidney disease Father    • " Dementia Father    • Diabetes Father    • Heart disease Father      Immunization History   Administered Date(s) Administered   • COVID-19 (MODERNA) 1st, 2nd, 3rd Dose Only 03/16/2021, 04/13/2021   • COVID-19 (PFIZER) PURPLE CAP 12/18/2021   • FluLaval/Fluzone >6mos 09/22/2020   • Influenza Quad Vaccine (Inpatient) 12/01/2014   • Influenza, Unspecified 10/12/2018, 12/01/2021   • flucelvax quad pfs =>4 YRS 12/02/2019       Office Visit on 03/22/2022   Component Date Value Ref Range Status   • Glucose 03/22/2022 144 (A) 65 - 99 mg/dL Final   • BUN 03/22/2022 11  8 - 27 mg/dL Final   • Creatinine 03/22/2022 0.82  0.57 - 1.00 mg/dL Final   • EGFR Result 03/22/2022 81  >59 mL/min/1.73 Final   • BUN/Creatinine Ratio 03/22/2022 13  12 - 28 Final   • Sodium 03/22/2022 142  134 - 144 mmol/L Final   • Potassium 03/22/2022 4.0  3.5 - 5.2 mmol/L Final   • Chloride 03/22/2022 99  96 - 106 mmol/L Final   • Total CO2 03/22/2022 25  20 - 29 mmol/L Final   • Calcium 03/22/2022 10.0  8.7 - 10.3 mg/dL Final   • Total Protein 03/22/2022 7.5  6.0 - 8.5 g/dL Final   • Albumin 03/22/2022 4.8  3.8 - 4.8 g/dL Final   • Globulin 03/22/2022 2.7  1.5 - 4.5 g/dL Final   • A/G Ratio 03/22/2022 1.8  1.2 - 2.2 Final   • Total Bilirubin 03/22/2022 0.6  0.0 - 1.2 mg/dL Final   • Alkaline Phosphatase 03/22/2022 82  44 - 121 IU/L Final   • AST (SGOT) 03/22/2022 30  0 - 40 IU/L Final   • ALT (SGPT) 03/22/2022 31  0 - 32 IU/L Final   • Hemoglobin A1C 03/22/2022 6.0 (A) 4.8 - 5.6 % Final    Comment:          Prediabetes: 5.7 - 6.4           Diabetes: >6.4           Glycemic control for adults with diabetes: <7.0     • Total Cholesterol 03/22/2022 167  100 - 199 mg/dL Final   • Triglycerides 03/22/2022 189 (A) 0 - 149 mg/dL Final   • HDL Cholesterol 03/22/2022 52  >39 mg/dL Final   • VLDL Cholesterol Obie 03/22/2022 32  5 - 40 mg/dL Final   • LDL Chol Calc ([facility]) 03/22/2022 83  0 - 99 mg/dL Final   • LDL/HDL RATIO 03/22/2022 1.6  0.0 - 3.2 ratio Final     Comment:                                     LDL/HDL Ratio                                              Men  Women                                1/2 Avg.Risk  1.0    1.5                                    Avg.Risk  3.6    3.2                                 2X Avg.Risk  6.2    5.0                                 3X Avg.Risk  8.0    6.1           Review of Systems   Constitutional: Negative.    HENT: Negative.    Respiratory: Negative.    Cardiovascular: Negative.    Gastrointestinal: Negative.    Genitourinary: Negative.    Musculoskeletal: Negative.    Skin: Negative.    Neurological: Negative.    Psychiatric/Behavioral: Negative.        Objective   Physical Exam  Constitutional:       Appearance: Normal appearance.   HENT:      Head: Normocephalic.   Cardiovascular:      Rate and Rhythm: Normal rate and regular rhythm.      Pulses: Normal pulses.      Heart sounds: Normal heart sounds.   Pulmonary:      Effort: Pulmonary effort is normal.      Breath sounds: Normal breath sounds.   Abdominal:      General: Bowel sounds are normal.   Musculoskeletal:         General: Normal range of motion.   Skin:     General: Skin is warm and dry.   Neurological:      General: No focal deficit present.      Mental Status: She is alert and oriented to person, place, and time.   Psychiatric:         Mood and Affect: Mood normal.         Behavior: Behavior normal.         Procedures    Assessment & Plan   Diagnoses and all orders for this visit:    1. Mixed hyperlipidemia (Primary)  -     Lipid Panel With LDL / HDL Ratio    2. Type 2 diabetes mellitus without complication, without long-term current use of insulin (HCC)  -     Comprehensive Metabolic Panel  -     Hemoglobin A1c    3. Class 1 obesity due to excess calories with serious comorbidity and body mass index (BMI) of 34.0 to 34.9 in adult    4. Other screening mammogram  -     Mammo Screening Digital Tomosynthesis Bilateral With CAD; Future          Current Outpatient  Medications:   •  atenolol (TENORMIN) 50 MG tablet, TAKE 1 TABLET BY MOUTH EVERY DAY, Disp: 90 tablet, Rfl: 1  •  lisinopril-hydrochlorothiazide (PRINZIDE,ZESTORETIC) 20-25 MG per tablet, TAKE 1 TABLET BY MOUTH EVERY DAY, Disp: 90 tablet, Rfl: 1  •  metFORMIN (GLUCOPHAGE) 500 MG tablet, TAKE 1 TABLET BY MOUTH TWICE A DAY WITH MEALS, Disp: 180 tablet, Rfl: 1  •  rosuvastatin (CRESTOR) 20 MG tablet, TAKE 1 TABLET BY MOUTH EVERYDAY AT BEDTIME, Disp: 90 tablet, Rfl: 1           Alexia Vang, APRN 9/22/2022 09:52 EDT  This note has been electronically signed

## 2022-09-22 NOTE — PATIENT INSTRUCTIONS
Fasting blood work  Mammogram at [facility]  Continue weight loss  Schedule eye exam this fall  Influenza and pneumonia vaccines today  Follow-up 6 months fasting

## 2022-09-23 LAB
ALBUMIN SERPL-MCNC: 4.8 G/DL (ref 3.8–4.8)
ALBUMIN/GLOB SERPL: 1.7 {RATIO} (ref 1.2–2.2)
ALP SERPL-CCNC: 88 IU/L (ref 44–121)
ALT SERPL-CCNC: 24 IU/L (ref 0–32)
AST SERPL-CCNC: 24 IU/L (ref 0–40)
BILIRUB SERPL-MCNC: 0.9 MG/DL (ref 0–1.2)
BUN SERPL-MCNC: 17 MG/DL (ref 8–27)
BUN/CREAT SERPL: 20 (ref 12–28)
CALCIUM SERPL-MCNC: 9.7 MG/DL (ref 8.7–10.3)
CHLORIDE SERPL-SCNC: 99 MMOL/L (ref 96–106)
CHOLEST SERPL-MCNC: 163 MG/DL (ref 100–199)
CO2 SERPL-SCNC: 22 MMOL/L (ref 20–29)
CREAT SERPL-MCNC: 0.86 MG/DL (ref 0.57–1)
EGFRCR SERPLBLD CKD-EPI 2021: 76 ML/MIN/1.73
GLOBULIN SER CALC-MCNC: 2.9 G/DL (ref 1.5–4.5)
GLUCOSE SERPL-MCNC: 157 MG/DL (ref 65–99)
HBA1C MFR BLD: 6.3 % (ref 4.8–5.6)
HDLC SERPL-MCNC: 48 MG/DL
LDLC SERPL CALC-MCNC: 80 MG/DL (ref 0–99)
LDLC/HDLC SERPL: 1.7 RATIO (ref 0–3.2)
POTASSIUM SERPL-SCNC: 4.1 MMOL/L (ref 3.5–5.2)
PROT SERPL-MCNC: 7.7 G/DL (ref 6–8.5)
SODIUM SERPL-SCNC: 139 MMOL/L (ref 134–144)
TRIGL SERPL-MCNC: 212 MG/DL (ref 0–149)
VLDLC SERPL CALC-MCNC: 35 MG/DL (ref 5–40)

## 2022-12-06 RX ORDER — LISINOPRIL AND HYDROCHLOROTHIAZIDE 25; 20 MG/1; MG/1
TABLET ORAL
Qty: 90 TABLET | Refills: 1 | Status: SHIPPED | OUTPATIENT
Start: 2022-12-06 | End: 2023-03-22 | Stop reason: SDUPTHER

## 2022-12-15 ENCOUNTER — HOSPITAL ENCOUNTER (OUTPATIENT)
Dept: MAMMOGRAPHY | Facility: HOSPITAL | Age: 62
Discharge: HOME | End: 2022-12-15
Admitting: NURSE PRACTITIONER

## 2022-12-15 DIAGNOSIS — Z12.31 OTHER SCREENING MAMMOGRAM: ICD-10-CM

## 2022-12-15 PROCEDURE — 77063 BREAST TOMOSYNTHESIS BI: CPT

## 2022-12-15 PROCEDURE — 77067 SCR MAMMO BI INCL CAD: CPT

## 2023-03-04 RX ORDER — ATENOLOL 50 MG/1
TABLET ORAL
Qty: 90 TABLET | Refills: 1 | Status: SHIPPED | OUTPATIENT
Start: 2023-03-04

## 2023-03-22 ENCOUNTER — OFFICE VISIT (OUTPATIENT)
Dept: FAMILY MEDICINE CLINIC | Facility: CLINIC | Age: 63
End: 2023-03-22
Payer: COMMERCIAL

## 2023-03-22 VITALS
OXYGEN SATURATION: 96 % | BODY MASS INDEX: 34.23 KG/M2 | HEART RATE: 71 BPM | DIASTOLIC BLOOD PRESSURE: 81 MMHG | WEIGHT: 213 LBS | SYSTOLIC BLOOD PRESSURE: 156 MMHG | HEIGHT: 66 IN | TEMPERATURE: 97.6 F

## 2023-03-22 DIAGNOSIS — K22.2 ESOPHAGEAL STRICTURE: ICD-10-CM

## 2023-03-22 DIAGNOSIS — E11.9 TYPE 2 DIABETES MELLITUS WITHOUT COMPLICATION, WITHOUT LONG-TERM CURRENT USE OF INSULIN: ICD-10-CM

## 2023-03-22 DIAGNOSIS — Z00.00 PREVENTATIVE HEALTH CARE: ICD-10-CM

## 2023-03-22 DIAGNOSIS — E66.09 CLASS 1 OBESITY DUE TO EXCESS CALORIES WITH SERIOUS COMORBIDITY AND BODY MASS INDEX (BMI) OF 34.0 TO 34.9 IN ADULT: ICD-10-CM

## 2023-03-22 DIAGNOSIS — E78.2 MIXED HYPERLIPIDEMIA: Primary | ICD-10-CM

## 2023-03-22 RX ORDER — LISINOPRIL AND HYDROCHLOROTHIAZIDE 25; 20 MG/1; MG/1
1 TABLET ORAL DAILY
Qty: 90 TABLET | Refills: 1 | Status: SHIPPED | OUTPATIENT
Start: 2023-03-22

## 2023-03-22 RX ORDER — ATORVASTATIN CALCIUM 20 MG/1
20 TABLET, FILM COATED ORAL DAILY
Qty: 90 TABLET | Refills: 1 | Status: SHIPPED | OUTPATIENT
Start: 2023-03-22 | End: 2023-03-27

## 2023-03-22 NOTE — PATIENT INSTRUCTIONS
Fasting blood work  Call office if you want to schedule EGD for topical stricture  Diet and exercise  Follow-up 6 months

## 2023-03-22 NOTE — PROGRESS NOTES
"    Mauricio Jacome is a 62 y.o. female.     History of Present Illness  62-year-old white female with history of hyperlipidemia, type 2 diabetes, intermittent migraines, fatigue, esophageal stricture, depression anxiety, plantar fasciitis, peripheral neuropathy and insomnia who comes in today for 6-month follow-up visit and fasting blood work    Blood pressure 156/80 heart rate 70 she denies any chest pain, dyspnea, tachycardia or dizziness    Patient is starting to have a little bit of esophageal stricture again however her colonoscopy is not due for another 8 years since it was negative.  I told patient that we could schedule her an EGD anytime she feels she needs 1 and want her against aspiration    Patient states blood sugars have usually been in the 140s her last A1c was 6.3 fasting blood sugar 157 and triglycerides 212.  We will be checking labs today    Weight is up 2 pounds at 213 with a BMI of 35.  She has had 3 COVID vaccines up-to-date on eye exam mammogram DEXA scan and colonoscopy is not due till 2030            Fasting blood work  Call office if you want to schedule EGD for topical stricture  Diet and exercise  Follow-up 6 months       The following portions of the patient's history were reviewed and updated as appropriate: allergies, current medications, past family history, past medical history, past social history, past surgical history and problem list.    Vitals:    03/22/23 0915   BP: 156/81   BP Location: Right arm   Patient Position: Sitting   Cuff Size: Large Adult   Pulse: 71   Temp: 97.6 °F (36.4 °C)   TempSrc: Temporal   SpO2: 96%   Weight: 96.6 kg (213 lb)   Height: 166.4 cm (65.5\")     Body mass index is 34.91 kg/m².    Past Medical History:   Diagnosis Date   • Diabetes mellitus (HCC)    • Hyperlipidemia    • Hypertension    • Obesity      Past Surgical History:   Procedure Laterality Date   • APPENDECTOMY     • CHOLECYSTECTOMY     • HYSTERECTOMY       Family History   Problem Relation " Age of Onset   • Heart failure Mother    • Multiple myeloma Mother    • Kidney disease Father    • Dementia Father    • Diabetes Father    • Heart disease Father      Immunization History   Administered Date(s) Administered   • COVID-19 (MODERNA) 1st, 2nd, 3rd Dose Only 03/16/2021, 04/13/2021   • COVID-19 (PFIZER) PURPLE CAP 12/18/2021   • FluLaval/Fluzone >6mos 09/22/2020, 09/22/2022   • Influenza Quad Vaccine (Inpatient) 12/01/2014   • Influenza, Unspecified 10/12/2018, 12/01/2021   • Pneumococcal Conjugate 20-Valent (PCV20) 09/22/2022   • flucelvax quad pfs =>4 YRS 12/02/2019       Office Visit on 09/22/2022   Component Date Value Ref Range Status   • Glucose 09/22/2022 157 (H)  65 - 99 mg/dL Final    Comment:                **Effective September 26, 2022 Glucose reference**                   interval will be changing to:                                                              70 - 99     • BUN 09/22/2022 17  8 - 27 mg/dL Final   • Creatinine 09/22/2022 0.86  0.57 - 1.00 mg/dL Final   • EGFR Result 09/22/2022 76  >59 mL/min/1.73 Final   • BUN/Creatinine Ratio 09/22/2022 20  12 - 28 Final   • Sodium 09/22/2022 139  134 - 144 mmol/L Final   • Potassium 09/22/2022 4.1  3.5 - 5.2 mmol/L Final   • Chloride 09/22/2022 99  96 - 106 mmol/L Final   • Total CO2 09/22/2022 22  20 - 29 mmol/L Final   • Calcium 09/22/2022 9.7  8.7 - 10.3 mg/dL Final   • Total Protein 09/22/2022 7.7  6.0 - 8.5 g/dL Final   • Albumin 09/22/2022 4.8  3.8 - 4.8 g/dL Final   • Globulin 09/22/2022 2.9  1.5 - 4.5 g/dL Final   • A/G Ratio 09/22/2022 1.7  1.2 - 2.2 Final   • Total Bilirubin 09/22/2022 0.9  0.0 - 1.2 mg/dL Final   • Alkaline Phosphatase 09/22/2022 88  44 - 121 IU/L Final   • AST (SGOT) 09/22/2022 24  0 - 40 IU/L Final   • ALT (SGPT) 09/22/2022 24  0 - 32 IU/L Final   • Hemoglobin A1C 09/22/2022 6.3 (H)  4.8 - 5.6 % Final    Comment:          Prediabetes: 5.7 - 6.4           Diabetes: >6.4           Glycemic control for adults with  diabetes: <7.0     • Total Cholesterol 09/22/2022 163  100 - 199 mg/dL Final   • Triglycerides 09/22/2022 212 (H)  0 - 149 mg/dL Final   • HDL Cholesterol 09/22/2022 48  >39 mg/dL Final   • VLDL Cholesterol Obie 09/22/2022 35  5 - 40 mg/dL Final   • LDL Chol Calc ([facility]) 09/22/2022 80  0 - 99 mg/dL Final   • LDL/HDL RATIO 09/22/2022 1.7  0.0 - 3.2 ratio Final    Comment:                                     LDL/HDL Ratio                                              Men  Women                                1/2 Avg.Risk  1.0    1.5                                    Avg.Risk  3.6    3.2                                 2X Avg.Risk  6.2    5.0                                 3X Avg.Risk  8.0    6.1           Review of Systems   Constitutional: Negative.    HENT: Negative.    Respiratory: Negative.    Cardiovascular: Negative.    Gastrointestinal: Negative.         Mild esophagus stricture   Genitourinary: Negative.    Musculoskeletal: Negative.    Neurological: Negative.    Psychiatric/Behavioral: Negative.        Objective   Physical Exam  Constitutional:       Appearance: Normal appearance.   HENT:      Head: Normocephalic.   Cardiovascular:      Rate and Rhythm: Normal rate and regular rhythm.      Pulses: Normal pulses.   Pulmonary:      Effort: Pulmonary effort is normal.      Breath sounds: Normal breath sounds.   Musculoskeletal:         General: Normal range of motion.   Skin:     General: Skin is warm and dry.   Neurological:      General: No focal deficit present.      Mental Status: She is alert and oriented to person, place, and time.   Psychiatric:         Mood and Affect: Mood normal.         Behavior: Behavior normal.         Procedures    Assessment & Plan   Diagnoses and all orders for this visit:    1. Mixed hyperlipidemia (Primary)  -     Lipid Panel With LDL / HDL Ratio    2. Type 2 diabetes mellitus without complication, without long-term current use of insulin (HCC)  -     Comprehensive Metabolic  Panel  -     Hemoglobin A1c    3. Preventative health care  -     CBC & Differential    4. Class 1 obesity due to excess calories with serious comorbidity and body mass index (BMI) of 34.0 to 34.9 in adult    5. Esophageal stricture    Other orders  -     lisinopril-hydrochlorothiazide (PRINZIDE,ZESTORETIC) 20-25 MG per tablet; Take 1 tablet by mouth Daily.  Dispense: 90 tablet; Refill: 1  -     atorvastatin (LIPITOR) 20 MG tablet; Take 1 tablet by mouth Daily.  Dispense: 90 tablet; Refill: 1          Current Outpatient Medications:   •  atenolol (TENORMIN) 50 MG tablet, TAKE 1 TABLET BY MOUTH EVERY DAY, Disp: 90 tablet, Rfl: 1  •  lisinopril-hydrochlorothiazide (PRINZIDE,ZESTORETIC) 20-25 MG per tablet, Take 1 tablet by mouth Daily., Disp: 90 tablet, Rfl: 1  •  metFORMIN (GLUCOPHAGE) 500 MG tablet, TAKE 1 TABLET BY MOUTH TWICE A DAY WITH MEALS, Disp: 180 tablet, Rfl: 1  •  atorvastatin (LIPITOR) 20 MG tablet, Take 1 tablet by mouth Daily., Disp: 90 tablet, Rfl: 1           Alexia Vang, APRN 3/22/2023 10:23 EDT  This note has been electronically signed

## 2023-03-23 LAB
ALBUMIN SERPL-MCNC: 4.6 G/DL (ref 3.8–4.8)
ALBUMIN/GLOB SERPL: 1.7 {RATIO} (ref 1.2–2.2)
ALP SERPL-CCNC: 76 IU/L (ref 44–121)
ALT SERPL-CCNC: 31 IU/L (ref 0–32)
AST SERPL-CCNC: 28 IU/L (ref 0–40)
BASOPHILS # BLD AUTO: 0.1 X10E3/UL (ref 0–0.2)
BASOPHILS NFR BLD AUTO: 1 %
BILIRUB SERPL-MCNC: 0.9 MG/DL (ref 0–1.2)
BUN SERPL-MCNC: 13 MG/DL (ref 8–27)
BUN/CREAT SERPL: 15 (ref 12–28)
CALCIUM SERPL-MCNC: 9.6 MG/DL (ref 8.7–10.3)
CHLORIDE SERPL-SCNC: 99 MMOL/L (ref 96–106)
CHOLEST SERPL-MCNC: 179 MG/DL (ref 100–199)
CO2 SERPL-SCNC: 26 MMOL/L (ref 20–29)
CREAT SERPL-MCNC: 0.88 MG/DL (ref 0.57–1)
EGFRCR SERPLBLD CKD-EPI 2021: 74 ML/MIN/1.73
EOSINOPHIL # BLD AUTO: 0.2 X10E3/UL (ref 0–0.4)
EOSINOPHIL NFR BLD AUTO: 2 %
ERYTHROCYTE [DISTWIDTH] IN BLOOD BY AUTOMATED COUNT: 12.8 % (ref 11.7–15.4)
GLOBULIN SER CALC-MCNC: 2.7 G/DL (ref 1.5–4.5)
GLUCOSE SERPL-MCNC: 143 MG/DL (ref 70–99)
HBA1C MFR BLD: 6.4 % (ref 4.8–5.6)
HCT VFR BLD AUTO: 42.2 % (ref 34–46.6)
HDLC SERPL-MCNC: 49 MG/DL
HGB BLD-MCNC: 14.6 G/DL (ref 11.1–15.9)
IMM GRANULOCYTES # BLD AUTO: 0 X10E3/UL (ref 0–0.1)
IMM GRANULOCYTES NFR BLD AUTO: 0 %
LDLC SERPL CALC-MCNC: 87 MG/DL (ref 0–99)
LDLC/HDLC SERPL: 1.8 RATIO (ref 0–3.2)
LYMPHOCYTES # BLD AUTO: 3.1 X10E3/UL (ref 0.7–3.1)
LYMPHOCYTES NFR BLD AUTO: 33 %
MCH RBC QN AUTO: 30.5 PG (ref 26.6–33)
MCHC RBC AUTO-ENTMCNC: 34.6 G/DL (ref 31.5–35.7)
MCV RBC AUTO: 88 FL (ref 79–97)
MONOCYTES # BLD AUTO: 0.6 X10E3/UL (ref 0.1–0.9)
MONOCYTES NFR BLD AUTO: 6 %
NEUTROPHILS # BLD AUTO: 5.6 X10E3/UL (ref 1.4–7)
NEUTROPHILS NFR BLD AUTO: 58 %
PLATELET # BLD AUTO: 188 X10E3/UL (ref 150–450)
POTASSIUM SERPL-SCNC: 3.8 MMOL/L (ref 3.5–5.2)
PROT SERPL-MCNC: 7.3 G/DL (ref 6–8.5)
RBC # BLD AUTO: 4.78 X10E6/UL (ref 3.77–5.28)
SODIUM SERPL-SCNC: 140 MMOL/L (ref 134–144)
TRIGL SERPL-MCNC: 257 MG/DL (ref 0–149)
VLDLC SERPL CALC-MCNC: 43 MG/DL (ref 5–40)
WBC # BLD AUTO: 9.5 X10E3/UL (ref 3.4–10.8)

## 2023-03-27 ENCOUNTER — TELEPHONE (OUTPATIENT)
Dept: FAMILY MEDICINE CLINIC | Facility: CLINIC | Age: 63
End: 2023-03-27

## 2023-03-27 NOTE — TELEPHONE ENCOUNTER
Caller: Mauricio Jacome    Relationship: Self    Best call back number: [phone]  Requested Prescriptions:      atorvastatin (LIPITOR) 20 MG tablet  Pharmacy where request should be sent:      Last office visit with prescribing clinician: 3/22/2023   Last telemedicine visit with prescribing clinician: Visit date not found   Next office visit with prescribing clinician: 9/25/2023     Additional details provided by patient:PATIENT  CANNOT TAKE LIPITOR BECAUSE IT MAKES HER JOINT HURT AND SHE DOESN'T WANT TO GO DOWN THAT ROAD AGAIN  PATIENT IS ASKING TO RETURN TO HER ORIGINAL MEDICATION OR ANOTHER MEDICATION     Does the patient have less than a 3 day supply:  [x] Yes  [] No    Would you like a call back once the refill request has been completed: [x] Yes [] No    If the office needs to give you a call back, can they leave a voicemail: [x] Yes [] No    Ira Muse Rep   03/27/23 13:44 EDT

## 2023-06-09 RX ORDER — ATORVASTATIN CALCIUM 20 MG/1
20 TABLET, FILM COATED ORAL DAILY
Qty: 90 TABLET | Refills: 0 | Status: SHIPPED | OUTPATIENT
Start: 2023-06-09 | End: 2023-06-12

## 2023-06-09 NOTE — TELEPHONE ENCOUNTER
Caller: Mauricio Jacome    Relationship: Self    Best call back number: [phone]    What medication are you requesting: CRESTOR     What are your current symptoms: CHOLESTEROL     How long have you been experiencing symptoms:     Have you had these symptoms before:    [x] Yes  [] No    Have you been treated for these symptoms before:   [x] Yes  [] No    If a prescription is needed, what is your preferred pharmacy and phone number: [facility] #[id number] - [place], [place] - [address] [facility] [phone] [facility] [phone]      Additional notes: PATIENT STATED SHE HAD NOT HEARD ANYTHING FROM THE MESSAGE IN [place]ARCH ABOUT THE CHOLESTEROL MEDICATION. STATED SHE WOULD LIKE TO BE PUT ON THE CRESTOR AGAIN.

## 2023-06-12 RX ORDER — PRAVASTATIN SODIUM 20 MG
20 TABLET ORAL DAILY
Qty: 90 TABLET | Refills: 0 | Status: SHIPPED | OUTPATIENT
Start: 2023-06-12

## 2023-08-28 RX ORDER — ATENOLOL 50 MG/1
TABLET ORAL
Qty: 90 TABLET | Refills: 1 | Status: SHIPPED | OUTPATIENT
Start: 2023-08-28

## 2023-09-08 RX ORDER — PRAVASTATIN SODIUM 20 MG
TABLET ORAL
Qty: 90 TABLET | Refills: 0 | Status: SHIPPED | OUTPATIENT
Start: 2023-09-08

## 2023-09-23 NOTE — TELEPHONE ENCOUNTER
Radiographer Jossy called from [facility] saying she needs an order placed for patient for Diag Right breast Mammo with possible Rt breast U/S.  Patient is going to be scheduled for this wed if not on the 29th. Will need order faxed to [facility] as well     Attending with

## 2023-09-25 ENCOUNTER — OFFICE VISIT (OUTPATIENT)
Dept: FAMILY MEDICINE CLINIC | Facility: CLINIC | Age: 63
End: 2023-09-25

## 2023-09-25 VITALS
BODY MASS INDEX: 33.78 KG/M2 | DIASTOLIC BLOOD PRESSURE: 100 MMHG | WEIGHT: 210.2 LBS | HEART RATE: 67 BPM | SYSTOLIC BLOOD PRESSURE: 142 MMHG | HEIGHT: 66 IN | TEMPERATURE: 96.8 F | OXYGEN SATURATION: 99 %

## 2023-09-25 DIAGNOSIS — Z12.31 OTHER SCREENING MAMMOGRAM: ICD-10-CM

## 2023-09-25 DIAGNOSIS — E66.09 CLASS 1 OBESITY DUE TO EXCESS CALORIES WITH SERIOUS COMORBIDITY AND BODY MASS INDEX (BMI) OF 34.0 TO 34.9 IN ADULT: ICD-10-CM

## 2023-09-25 DIAGNOSIS — I10 ESSENTIAL HYPERTENSION: ICD-10-CM

## 2023-09-25 DIAGNOSIS — E11.9 TYPE 2 DIABETES MELLITUS WITHOUT COMPLICATION, WITHOUT LONG-TERM CURRENT USE OF INSULIN: ICD-10-CM

## 2023-09-25 DIAGNOSIS — Z78.0 POSTMENOPAUSAL: ICD-10-CM

## 2023-09-25 DIAGNOSIS — E78.2 MIXED HYPERLIPIDEMIA: Primary | ICD-10-CM

## 2023-09-25 PROCEDURE — 99213 OFFICE O/P EST LOW 20 MIN: CPT | Performed by: NURSE PRACTITIONER

## 2023-09-25 RX ORDER — LISINOPRIL AND HYDROCHLOROTHIAZIDE 25; 20 MG/1; MG/1
1 TABLET ORAL DAILY
Qty: 90 TABLET | Refills: 1 | Status: SHIPPED | OUTPATIENT
Start: 2023-09-25

## 2023-09-25 RX ORDER — AMLODIPINE BESYLATE 2.5 MG/1
2.5 TABLET ORAL
Qty: 30 TABLET | Refills: 2 | Status: SHIPPED | OUTPATIENT
Start: 2023-09-25

## 2023-09-25 NOTE — PATIENT INSTRUCTIONS
Fasting blood work  Amlodipine 2.5 mg daily  Monitor blood pressure with parameters given  Mammogram/DEXA scan  Continue diet and exercise  Follow-up 6 months

## 2023-09-25 NOTE — PROGRESS NOTES
"    Mauricio Jacome is a 63 y.o. female.     History of Present Illness  63-year-old white female with history hyperlipidemia, type 2 diabetes, intermittent migraines, fatigue, tropical stricture, depression anxiety, plantar fasciitis, peripheral neuropathy and insomnia who comes in today for 6-month follow-up visit    Blood pressure 148/100 heart rate 66 she denies any chest pain, dyspnea, tachycardia or dizziness.  Patient states blood pressure runs in the 90s on the bottom at home.  We will add a small dose amlodipine patient will monitor blood pressure with parameters given and call the office    Patient states sugars are less than 140 normally her last A1c was 6.4 fasting blood sugar 143 and triglycerides 257 we will be doing fasting labs today    Since have seen patient she has had a EGD with something called dilatation and states she is swallowing much better    Weight is down 3 pounds at 210 with a BMI of 34.4.  She has had 3 COVID vaccines up-to-date on eye exam I am scheduling her mammogram and DEXA scan at [place] and her next colonoscopy is due in 2030.  Patient's had a hysterectomy no longer does Pap smears            Fasting blood work  Amlodipine 2.5 mg daily  Monitor blood pressure with parameters given  Mammogram/DEXA scan  Continue diet and exercise  Follow-up 6 months     The following portions of the patient's history were reviewed and updated as appropriate: allergies, current medications, past family history, past medical history, past social history, past surgical history, and problem list.    Vitals:    09/25/23 0809 09/25/23 0812   BP: (!) 172/106 142/100   BP Location: Right arm Right arm   Patient Position: Sitting Sitting   Cuff Size: Adult Adult   Pulse: 67    Temp: 96.8 °F (36 °C)    TempSrc: Infrared    SpO2: 99%    Weight: 95.3 kg (210 lb 3.2 oz)    Height: 166.4 cm (65.51\")      Body mass index is 34.43 kg/m².    Past Medical History:   Diagnosis Date    Diabetes mellitus     " Hyperlipidemia     Hypertension     Obesity      Past Surgical History:   Procedure Laterality Date    APPENDECTOMY      CHOLECYSTECTOMY      HYSTERECTOMY       Family History   Problem Relation Age of Onset    Heart failure Mother     Multiple myeloma Mother     Kidney disease Father     Dementia Father     Diabetes Father     Heart disease Father      Immunization History   Administered Date(s) Administered    COVID-19 (MODERNA) 1st,2nd,3rd Dose Monovalent 03/16/2021, 04/13/2021    COVID-19 (PFIZER) Purple Cap Monovalent 12/18/2021    Fluzone (or Fluarix & Flulaval for VFC) >6mos 09/22/2020, 09/22/2022    Influenza Quad Vaccine (Inpatient) 12/01/2014    Influenza, Unspecified 10/12/2018, 12/01/2021    Pneumococcal Conjugate 20-Valent (PCV20) 09/22/2022    flucelvax quad pfs =>4 YRS 12/02/2019       Office Visit on 03/22/2023   Component Date Value Ref Range Status    WBC 03/22/2023 9.5  3.4 - 10.8 x10E3/uL Final    RBC 03/22/2023 4.78  3.77 - 5.28 x10E6/uL Final    Hemoglobin 03/22/2023 14.6  11.1 - 15.9 g/dL Final    Hematocrit 03/22/2023 42.2  34.0 - 46.6 % Final    MCV 03/22/2023 88  79 - 97 fL Final    MCH 03/22/2023 30.5  26.6 - 33.0 pg Final    MCHC 03/22/2023 34.6  31.5 - 35.7 g/dL Final    RDW 03/22/2023 12.8  11.7 - 15.4 % Final    Platelets 03/22/2023 188  150 - 450 x10E3/uL Final    Neutrophil Rel % 03/22/2023 58  Not Estab. % Final    Lymphocyte Rel % 03/22/2023 33  Not Estab. % Final    Monocyte Rel % 03/22/2023 6  Not Estab. % Final    Eosinophil Rel % 03/22/2023 2  Not Estab. % Final    Basophil Rel % 03/22/2023 1  Not Estab. % Final    Neutrophils Absolute 03/22/2023 5.6  1.4 - 7.0 x10E3/uL Final    Lymphocytes Absolute 03/22/2023 3.1  0.7 - 3.1 x10E3/uL Final    Monocytes Absolute 03/22/2023 0.6  0.1 - 0.9 x10E3/uL Final    Eosinophils Absolute 03/22/2023 0.2  0.0 - 0.4 x10E3/uL Final    Basophils Absolute 03/22/2023 0.1  0.0 - 0.2 x10E3/uL Final    Immature Granulocyte Rel % 03/22/2023 0  Not  Estab. % Final    Immature Grans Absolute 03/22/2023 0.0  0.0 - 0.1 x10E3/uL Final    Glucose 03/22/2023 143 (H)  70 - 99 mg/dL Final    BUN 03/22/2023 13  8 - 27 mg/dL Final    Creatinine 03/22/2023 0.88  0.57 - 1.00 mg/dL Final    EGFR Result 03/22/2023 74  >59 mL/min/1.73 Final    BUN/Creatinine Ratio 03/22/2023 15  12 - 28 Final    Sodium 03/22/2023 140  134 - 144 mmol/L Final    Potassium 03/22/2023 3.8  3.5 - 5.2 mmol/L Final    Chloride 03/22/2023 99  96 - 106 mmol/L Final    Total CO2 03/22/2023 26  20 - 29 mmol/L Final    Calcium 03/22/2023 9.6  8.7 - 10.3 mg/dL Final    Total Protein 03/22/2023 7.3  6.0 - 8.5 g/dL Final    Albumin 03/22/2023 4.6  3.8 - 4.8 g/dL Final    Globulin 03/22/2023 2.7  1.5 - 4.5 g/dL Final    A/G Ratio 03/22/2023 1.7  1.2 - 2.2 Final    Total Bilirubin 03/22/2023 0.9  0.0 - 1.2 mg/dL Final    Alkaline Phosphatase 03/22/2023 76  44 - 121 IU/L Final    AST (SGOT) 03/22/2023 28  0 - 40 IU/L Final    ALT (SGPT) 03/22/2023 31  0 - 32 IU/L Final    Total Cholesterol 03/22/2023 179  100 - 199 mg/dL Final    Triglycerides 03/22/2023 257 (H)  0 - 149 mg/dL Final    HDL Cholesterol 03/22/2023 49  >39 mg/dL Final    VLDL Cholesterol Obie 03/22/2023 43 (H)  5 - 40 mg/dL Final    LDL Chol Calc ([facility]) 03/22/2023 87  0 - 99 mg/dL Final    LDL/HDL RATIO 03/22/2023 1.8  0.0 - 3.2 ratio Final    Comment:                                     LDL/HDL Ratio                                              Men  Women                                1/2 Avg.Risk  1.0    1.5                                    Avg.Risk  3.6    3.2                                 2X Avg.Risk  6.2    5.0                                 3X Avg.Risk  8.0    6.1      Hemoglobin A1C 03/22/2023 6.4 (H)  4.8 - 5.6 % Final    Comment:          Prediabetes: 5.7 - 6.4           Diabetes: >6.4           Glycemic control for adults with diabetes: <7.0           Review of Systems   Constitutional: Negative.    HENT: Negative.     Respiratory:  Negative.     Cardiovascular: Negative.    Gastrointestinal: Negative.    Genitourinary: Negative.    Musculoskeletal: Negative.    Skin: Negative.    Neurological: Negative.    Psychiatric/Behavioral: Negative.       Objective   Physical Exam  Constitutional:       Appearance: Normal appearance.   HENT:      Head: Normocephalic.   Cardiovascular:      Rate and Rhythm: Normal rate and regular rhythm.      Pulses: Normal pulses.      Heart sounds: Normal heart sounds.   Pulmonary:      Effort: Pulmonary effort is normal.      Breath sounds: Normal breath sounds.   Abdominal:      General: Bowel sounds are normal.   Musculoskeletal:         General: Normal range of motion.   Skin:     General: Skin is warm.   Neurological:      General: No focal deficit present.      Mental Status: She is alert and oriented to person, place, and time.   Psychiatric:         Mood and Affect: Mood normal.         Behavior: Behavior normal.       Procedures    Assessment & Plan   Diagnoses and all orders for this visit:    1. Mixed hyperlipidemia (Primary)  -     Lipid Panel With LDL / HDL Ratio    2. Type 2 diabetes mellitus without complication, without long-term current use of insulin  -     Comprehensive Metabolic Panel  -     Hemoglobin A1c    3. Postmenopausal  -     DEXA Bone Density Axial; Future    4. Other screening mammogram  -     Mammo Screening Digital Tomosynthesis Bilateral With CAD; Future    5. Essential hypertension    6. Class 1 obesity due to excess calories with serious comorbidity and body mass index (BMI) of 34.0 to 34.9 in adult    Other orders  -     amLODIPine (NORVASC) 2.5 MG tablet; Take 1 tablet by mouth every night at bedtime.  Dispense: 30 tablet; Refill: 2  -     lisinopril-hydrochlorothiazide (PRINZIDE,ZESTORETIC) 20-25 MG per tablet; Take 1 tablet by mouth Daily.  Dispense: 90 tablet; Refill: 1           Current Outpatient Medications:     atenolol (TENORMIN) 50 MG tablet, TAKE 1 TABLET BY MOUTH EVERY  DAY, Disp: 90 tablet, Rfl: 1    lisinopril-hydrochlorothiazide (PRINZIDE,ZESTORETIC) 20-25 MG per tablet, Take 1 tablet by mouth Daily., Disp: 90 tablet, Rfl: 1    metFORMIN (GLUCOPHAGE) 500 MG tablet, TAKE 1 TABLET BY MOUTH TWICE A DAY WITH MEALS, Disp: 180 tablet, Rfl: 1    pravastatin (PRAVACHOL) 20 MG tablet, TAKE 1 TABLET BY MOUTH EVERY DAY, Disp: 90 tablet, Rfl: 0    amLODIPine (NORVASC) 2.5 MG tablet, Take 1 tablet by mouth every night at bedtime., Disp: 30 tablet, Rfl: 2           Alexia Vang, APRN 9/25/2023 08:32 EDT  This note has been electronically signed

## 2023-09-26 ENCOUNTER — TELEPHONE (OUTPATIENT)
Dept: FAMILY MEDICINE CLINIC | Facility: CLINIC | Age: 63
End: 2023-09-26
Payer: COMMERCIAL

## 2023-09-26 LAB
ALBUMIN SERPL-MCNC: 4.7 G/DL (ref 3.9–4.9)
ALBUMIN/GLOB SERPL: 1.6 {RATIO} (ref 1.2–2.2)
ALP SERPL-CCNC: 80 IU/L (ref 44–121)
ALT SERPL-CCNC: 36 IU/L (ref 0–32)
AST SERPL-CCNC: 36 IU/L (ref 0–40)
BILIRUB SERPL-MCNC: 1 MG/DL (ref 0–1.2)
BUN SERPL-MCNC: 13 MG/DL (ref 8–27)
BUN/CREAT SERPL: 15 (ref 12–28)
CALCIUM SERPL-MCNC: 9.9 MG/DL (ref 8.7–10.3)
CHLORIDE SERPL-SCNC: 100 MMOL/L (ref 96–106)
CHOLEST SERPL-MCNC: 243 MG/DL (ref 100–199)
CO2 SERPL-SCNC: 26 MMOL/L (ref 20–29)
CREAT SERPL-MCNC: 0.85 MG/DL (ref 0.57–1)
EGFRCR SERPLBLD CKD-EPI 2021: 76 ML/MIN/1.73
GLOBULIN SER CALC-MCNC: 3 G/DL (ref 1.5–4.5)
GLUCOSE SERPL-MCNC: 145 MG/DL (ref 70–99)
HBA1C MFR BLD: 6.2 % (ref 4.8–5.6)
HDLC SERPL-MCNC: 46 MG/DL
LDLC SERPL CALC-MCNC: 155 MG/DL (ref 0–99)
LDLC/HDLC SERPL: 3.4 RATIO (ref 0–3.2)
POTASSIUM SERPL-SCNC: 4.3 MMOL/L (ref 3.5–5.2)
PROT SERPL-MCNC: 7.7 G/DL (ref 6–8.5)
SODIUM SERPL-SCNC: 141 MMOL/L (ref 134–144)
TRIGL SERPL-MCNC: 231 MG/DL (ref 0–149)
VLDLC SERPL CALC-MCNC: 42 MG/DL (ref 5–40)

## 2023-09-26 NOTE — TELEPHONE ENCOUNTER
Sent pt a grace msg since she does use it    HUB TO READ    A1c a little better and blood sugar is okay cholesterol panel has worsened watch diet and try for weight loss

## 2023-10-05 ENCOUNTER — FLU SHOT (OUTPATIENT)
Dept: FAMILY MEDICINE CLINIC | Facility: CLINIC | Age: 63
End: 2023-10-05
Payer: COMMERCIAL

## 2023-10-05 DIAGNOSIS — Z23 NEED FOR INFLUENZA VACCINATION: Primary | ICD-10-CM

## 2023-10-05 PROCEDURE — 90686 IIV4 VACC NO PRSV 0.5 ML IM: CPT | Performed by: NURSE PRACTITIONER

## 2023-10-05 PROCEDURE — 90471 IMMUNIZATION ADMIN: CPT | Performed by: NURSE PRACTITIONER

## 2023-12-06 RX ORDER — PRAVASTATIN SODIUM 20 MG
TABLET ORAL
Qty: 90 TABLET | Refills: 0 | Status: SHIPPED | OUTPATIENT
Start: 2023-12-06

## 2023-12-18 RX ORDER — AMLODIPINE BESYLATE 2.5 MG/1
2.5 TABLET ORAL
Qty: 30 TABLET | Refills: 2 | Status: SHIPPED | OUTPATIENT
Start: 2023-12-18

## 2023-12-19 ENCOUNTER — HOSPITAL ENCOUNTER (OUTPATIENT)
Dept: MAMMOGRAPHY | Facility: HOSPITAL | Age: 63
Discharge: HOME | End: 2023-12-19
Payer: COMMERCIAL

## 2023-12-19 ENCOUNTER — HOSPITAL ENCOUNTER (OUTPATIENT)
Dept: BONE DENSITY | Facility: HOSPITAL | Age: 63
Discharge: HOME | End: 2023-12-19
Payer: COMMERCIAL

## 2023-12-19 DIAGNOSIS — Z12.31 OTHER SCREENING MAMMOGRAM: ICD-10-CM

## 2023-12-19 DIAGNOSIS — Z78.0 POSTMENOPAUSAL: ICD-10-CM

## 2023-12-19 PROCEDURE — 77080 DXA BONE DENSITY AXIAL: CPT

## 2023-12-19 PROCEDURE — 77063 BREAST TOMOSYNTHESIS BI: CPT

## 2023-12-19 PROCEDURE — 77067 SCR MAMMO BI INCL CAD: CPT

## 2024-02-26 RX ORDER — ATENOLOL 50 MG/1
TABLET ORAL
Qty: 90 TABLET | Refills: 1 | Status: SHIPPED | OUTPATIENT
Start: 2024-02-26

## 2024-03-04 RX ORDER — PRAVASTATIN SODIUM 20 MG
TABLET ORAL
Qty: 90 TABLET | Refills: 0 | Status: SHIPPED | OUTPATIENT
Start: 2024-03-04

## 2024-03-14 RX ORDER — AMLODIPINE BESYLATE 2.5 MG/1
2.5 TABLET ORAL
Qty: 90 TABLET | Refills: 0 | Status: SHIPPED | OUTPATIENT
Start: 2024-03-14

## 2024-03-25 ENCOUNTER — OFFICE VISIT (OUTPATIENT)
Dept: FAMILY MEDICINE CLINIC | Facility: CLINIC | Age: 64
End: 2024-03-25
Payer: COMMERCIAL

## 2024-03-25 VITALS
DIASTOLIC BLOOD PRESSURE: 80 MMHG | SYSTOLIC BLOOD PRESSURE: 128 MMHG | HEART RATE: 79 BPM | WEIGHT: 209 LBS | BODY MASS INDEX: 33.59 KG/M2 | OXYGEN SATURATION: 97 % | HEIGHT: 66 IN | TEMPERATURE: 97.3 F

## 2024-03-25 DIAGNOSIS — E78.2 MIXED HYPERLIPIDEMIA: ICD-10-CM

## 2024-03-25 DIAGNOSIS — Z00.00 PREVENTATIVE HEALTH CARE: ICD-10-CM

## 2024-03-25 DIAGNOSIS — E66.09 CLASS 1 OBESITY DUE TO EXCESS CALORIES WITH SERIOUS COMORBIDITY AND BODY MASS INDEX (BMI) OF 34.0 TO 34.9 IN ADULT: ICD-10-CM

## 2024-03-25 DIAGNOSIS — E11.9 TYPE 2 DIABETES MELLITUS WITHOUT COMPLICATION, WITHOUT LONG-TERM CURRENT USE OF INSULIN: Primary | ICD-10-CM

## 2024-03-25 DIAGNOSIS — G47.09 OTHER INSOMNIA: ICD-10-CM

## 2024-03-25 RX ORDER — HYDROXYZINE 50 MG/1
TABLET, FILM COATED ORAL
Qty: 14 TABLET | Refills: 0 | Status: SHIPPED | OUTPATIENT
Start: 2024-03-25

## 2024-03-25 RX ORDER — LISINOPRIL AND HYDROCHLOROTHIAZIDE 25; 20 MG/1; MG/1
1 TABLET ORAL DAILY
Qty: 90 TABLET | Refills: 1 | Status: SHIPPED | OUTPATIENT
Start: 2024-03-25

## 2024-03-25 NOTE — PROGRESS NOTES
"    Mauricio Jacome is a 63 y.o. female.     History of Present Illness  63-year-old white female with history of hyperlipidemia, type 2 diabetes, intermittent migraines, fatigue, esophageal stricture, depression anxiety, plantar fasciitis, peripheral neuropathy and insomnia who comes in today for 6-month follow-up visit and fasting blood work    Blood pressure 128/80 heart rate 78 she denies any chest pain, dyspnea, tachycardia or dizziness    Patient states blood sugars are running in the 140s fasting in the morning her last fasting blood sugar was 145 A1c 6.2.  For some reason however her lipid panel elevated quite severely from her last labs and she is taking her statin will be rechecking labs today    Her only complaint is not sleeping well will try her on a trial of hydroxyzine she will let me know if she wants to reorder that    Weight is 209 with a BMI of 34.24.  She has had 3 COVID vaccines is up-to-date on eye exam.  Her next mammogram is due in December 2024 DEXA scan December 2025 and she has had a hysterectomy and longer does Pap smears.  Her next colonoscopy is due in 2030            Fasting blood work  Diet exercise  Hydroxyzine 50 mg 1/2 to 2 tablets 30 minutes before bed trial  Follow-up 6 months       The following portions of the patient's history were reviewed and updated as appropriate: allergies, current medications, past family history, past medical history, past social history, past surgical history, and problem list.    Vitals:    03/25/24 0824   BP: 128/80   BP Location: Right arm   Patient Position: Sitting   Cuff Size: Large Adult   Pulse: 79   Temp: 97.3 °F (36.3 °C)   TempSrc: Infrared   SpO2: 97%   Weight: 94.8 kg (209 lb)   Height: 166.4 cm (65.51\")     Body mass index is 34.24 kg/m².    Past Medical History:   Diagnosis Date    Diabetes mellitus     Hyperlipidemia     Hypertension     Obesity      Past Surgical History:   Procedure Laterality Date    APPENDECTOMY      " CHOLECYSTECTOMY      HYSTERECTOMY       Family History   Problem Relation Age of Onset    Heart failure Mother     Multiple myeloma Mother     Kidney disease Father     Dementia Father     Diabetes Father     Heart disease Father      Immunization History   Administered Date(s) Administered    COVID-19 (MODERNA) 1st,2nd,3rd Dose Monovalent 03/16/2021, 04/13/2021    COVID-19 (PFIZER) Purple Cap Monovalent 12/18/2021    Fluzone (or Fluarix & Flulaval for VFC) >6mos 09/22/2020, 09/22/2022, 10/05/2023    Influenza Quad Vaccine (Inpatient) 12/01/2014    Influenza, Unspecified 10/12/2018, 12/01/2021    Pneumococcal Conjugate 20-Valent (PCV20) 09/22/2022    flucelvax quad pfs =>4 YRS 12/02/2019       Office Visit on 09/25/2023   Component Date Value Ref Range Status    Glucose 09/25/2023 145 (H)  70 - 99 mg/dL Final    BUN 09/25/2023 13  8 - 27 mg/dL Final    Creatinine 09/25/2023 0.85  0.57 - 1.00 mg/dL Final    EGFR Result 09/25/2023 76  >59 mL/min/1.73 Final    BUN/Creatinine Ratio 09/25/2023 15  12 - 28 Final    Sodium 09/25/2023 141  134 - 144 mmol/L Final    Potassium 09/25/2023 4.3  3.5 - 5.2 mmol/L Final    Chloride 09/25/2023 100  96 - 106 mmol/L Final    Total CO2 09/25/2023 26  20 - 29 mmol/L Final    Calcium 09/25/2023 9.9  8.7 - 10.3 mg/dL Final    Total Protein 09/25/2023 7.7  6.0 - 8.5 g/dL Final    Albumin 09/25/2023 4.7  3.9 - 4.9 g/dL Final    Globulin 09/25/2023 3.0  1.5 - 4.5 g/dL Final    A/G Ratio 09/25/2023 1.6  1.2 - 2.2 Final    Total Bilirubin 09/25/2023 1.0  0.0 - 1.2 mg/dL Final    Alkaline Phosphatase 09/25/2023 80  44 - 121 IU/L Final    AST (SGOT) 09/25/2023 36  0 - 40 IU/L Final    ALT (SGPT) 09/25/2023 36 (H)  0 - 32 IU/L Final    Hemoglobin A1C 09/25/2023 6.2 (H)  4.8 - 5.6 % Final    Comment:          Prediabetes: 5.7 - 6.4           Diabetes: >6.4           Glycemic control for adults with diabetes: <7.0      Total Cholesterol 09/25/2023 243 (H)  100 - 199 mg/dL Final    Triglycerides  09/25/2023 231 (H)  0 - 149 mg/dL Final    HDL Cholesterol 09/25/2023 46  >39 mg/dL Final    VLDL Cholesterol Obie 09/25/2023 42 (H)  5 - 40 mg/dL Final    LDL Chol Calc (NIH) 09/25/2023 155 (H)  0 - 99 mg/dL Final    LDL/HDL RATIO 09/25/2023 3.4 (H)  0.0 - 3.2 ratio Final    Comment:                                     LDL/HDL Ratio                                              Men  Women                                1/2 Avg.Risk  1.0    1.5                                    Avg.Risk  3.6    3.2                                 2X Avg.Risk  6.2    5.0                                 3X Avg.Risk  8.0    6.1           Review of Systems   Constitutional: Negative.    HENT: Negative.     Respiratory: Negative.     Cardiovascular: Negative.    Gastrointestinal: Negative.    Genitourinary: Negative.    Musculoskeletal: Negative.    Skin: Negative.    Neurological: Negative.    Psychiatric/Behavioral: Negative.         Objective   Physical Exam  Constitutional:       Appearance: Normal appearance.   HENT:      Head: Normocephalic.   Cardiovascular:      Rate and Rhythm: Normal rate and regular rhythm.      Pulses: Normal pulses.      Heart sounds: Normal heart sounds.   Pulmonary:      Effort: Pulmonary effort is normal.      Breath sounds: Normal breath sounds.   Abdominal:      General: Bowel sounds are normal.   Musculoskeletal:         General: Normal range of motion.   Skin:     General: Skin is warm.   Neurological:      General: No focal deficit present.      Mental Status: She is alert and oriented to person, place, and time.   Psychiatric:         Mood and Affect: Mood normal.         Behavior: Behavior normal.         Procedures    Assessment & Plan   Diagnoses and all orders for this visit:    1. Type 2 diabetes mellitus without complication, without long-term current use of insulin (Primary)  -     Comprehensive Metabolic Panel  -     Hemoglobin A1c    2. Mixed hyperlipidemia  -     Lipid Panel With LDL / HDL  "Ratio    3. [facility] care  -     CBC & Differential  -     TSH+Free T4  -     T3    Other orders  -     lisinopril-hydrochlorothiazide (PRINZIDE,ZESTORETIC) 20-25 MG per tablet; Take 1 tablet by mouth Daily.  Dispense: 90 tablet; Refill: 1  -     hydrOXYzine (ATARAX) 50 MG tablet; 1/2 to 2 tabs 30\" before bed  Dispense: 14 tablet; Refill: 0           Current Outpatient Medications:     amLODIPine (NORVASC) 2.5 MG tablet, TAKE 1 TABLET BY MOUTH EVERYDAY AT BEDTIME, Disp: 90 tablet, Rfl: 0    atenolol (TENORMIN) 50 MG tablet, TAKE 1 TABLET BY MOUTH EVERY DAY, Disp: 90 tablet, Rfl: 1    lisinopril-hydrochlorothiazide (PRINZIDE,ZESTORETIC) 20-25 MG per tablet, Take 1 tablet by mouth Daily., Disp: 90 tablet, Rfl: 1    metFORMIN (GLUCOPHAGE) 500 MG tablet, TAKE 1 TABLET BY MOUTH TWICE A DAY WITH FOOD, Disp: 180 tablet, Rfl: 1    pravastatin (PRAVACHOL) 20 MG tablet, TAKE 1 TABLET BY MOUTH EVERY DAY, Disp: 90 tablet, Rfl: 0    hydrOXYzine (ATARAX) 50 MG tablet, 1/2 to 2 tabs 30\" before bed, Disp: 14 tablet, Rfl: 0           WALKER De La Rosa 3/25/2024 08:42 EDT  This note has been electronically signed  "

## 2024-03-25 NOTE — PATIENT INSTRUCTIONS
Fasting blood work  Diet exercise  Hydroxyzine 50 mg 1/2 to 2 tablets 30 minutes before bed trial  Follow-up 6 months

## 2024-03-26 LAB
ALBUMIN SERPL-MCNC: 4.5 G/DL (ref 3.9–4.9)
ALBUMIN/GLOB SERPL: 1.6 {RATIO} (ref 1.2–2.2)
ALP SERPL-CCNC: 87 IU/L (ref 44–121)
ALT SERPL-CCNC: 27 IU/L (ref 0–32)
AST SERPL-CCNC: 20 IU/L (ref 0–40)
BASOPHILS # BLD AUTO: 0 X10E3/UL (ref 0–0.2)
BASOPHILS NFR BLD AUTO: 0 %
BILIRUB SERPL-MCNC: 0.7 MG/DL (ref 0–1.2)
BUN SERPL-MCNC: 18 MG/DL (ref 8–27)
BUN/CREAT SERPL: 19 (ref 12–28)
CALCIUM SERPL-MCNC: 9.9 MG/DL (ref 8.7–10.3)
CHLORIDE SERPL-SCNC: 99 MMOL/L (ref 96–106)
CHOLEST SERPL-MCNC: 231 MG/DL (ref 100–199)
CO2 SERPL-SCNC: 23 MMOL/L (ref 20–29)
CREAT SERPL-MCNC: 0.97 MG/DL (ref 0.57–1)
EGFRCR SERPLBLD CKD-EPI 2021: 65 ML/MIN/1.73
EOSINOPHIL # BLD AUTO: 0.1 X10E3/UL (ref 0–0.4)
EOSINOPHIL NFR BLD AUTO: 1 %
ERYTHROCYTE [DISTWIDTH] IN BLOOD BY AUTOMATED COUNT: 12.5 % (ref 11.7–15.4)
GLOBULIN SER CALC-MCNC: 2.9 G/DL (ref 1.5–4.5)
GLUCOSE SERPL-MCNC: 143 MG/DL (ref 70–99)
HBA1C MFR BLD: 6.1 % (ref 4.8–5.6)
HCT VFR BLD AUTO: 44.1 % (ref 34–46.6)
HDLC SERPL-MCNC: 48 MG/DL
HGB BLD-MCNC: 15.3 G/DL (ref 11.1–15.9)
IMM GRANULOCYTES # BLD AUTO: 0 X10E3/UL (ref 0–0.1)
IMM GRANULOCYTES NFR BLD AUTO: 0 %
LDLC SERPL CALC-MCNC: 137 MG/DL (ref 0–99)
LDLC/HDLC SERPL: 2.9 RATIO (ref 0–3.2)
LYMPHOCYTES # BLD AUTO: 2.8 X10E3/UL (ref 0.7–3.1)
LYMPHOCYTES NFR BLD AUTO: 28 %
MCH RBC QN AUTO: 31.4 PG (ref 26.6–33)
MCHC RBC AUTO-ENTMCNC: 34.7 G/DL (ref 31.5–35.7)
MCV RBC AUTO: 90 FL (ref 79–97)
MONOCYTES # BLD AUTO: 0.7 X10E3/UL (ref 0.1–0.9)
MONOCYTES NFR BLD AUTO: 7 %
NEUTROPHILS # BLD AUTO: 6.4 X10E3/UL (ref 1.4–7)
NEUTROPHILS NFR BLD AUTO: 64 %
PLATELET # BLD AUTO: 226 X10E3/UL (ref 150–450)
POTASSIUM SERPL-SCNC: 4.2 MMOL/L (ref 3.5–5.2)
PROT SERPL-MCNC: 7.4 G/DL (ref 6–8.5)
RBC # BLD AUTO: 4.88 X10E6/UL (ref 3.77–5.28)
SODIUM SERPL-SCNC: 139 MMOL/L (ref 134–144)
T3 SERPL-MCNC: 126 NG/DL (ref 71–180)
T4 FREE SERPL-MCNC: 1.26 NG/DL (ref 0.82–1.77)
TRIGL SERPL-MCNC: 255 MG/DL (ref 0–149)
TSH SERPL DL<=0.005 MIU/L-ACNC: 1.61 UIU/ML (ref 0.45–4.5)
VLDLC SERPL CALC-MCNC: 46 MG/DL (ref 5–40)
WBC # BLD AUTO: 10.2 X10E3/UL (ref 3.4–10.8)

## 2024-06-01 RX ORDER — PRAVASTATIN SODIUM 20 MG
TABLET ORAL
Qty: 90 TABLET | Refills: 0 | Status: SHIPPED | OUTPATIENT
Start: 2024-06-01

## 2024-06-04 RX ORDER — PRAVASTATIN SODIUM 20 MG
TABLET ORAL
Qty: 90 TABLET | Refills: 0 | Status: SHIPPED | OUTPATIENT
Start: 2024-06-04

## 2024-06-10 RX ORDER — AMLODIPINE BESYLATE 2.5 MG/1
2.5 TABLET ORAL
Qty: 90 TABLET | Refills: 0 | Status: SHIPPED | OUTPATIENT
Start: 2024-06-10

## 2024-08-13 ENCOUNTER — TELEPHONE (OUTPATIENT)
Dept: FAMILY MEDICINE CLINIC | Facility: CLINIC | Age: 64
End: 2024-08-13
Payer: MEDICARE

## 2024-08-19 RX ORDER — ATENOLOL 50 MG/1
TABLET ORAL
Qty: 90 TABLET | Refills: 1 | Status: SHIPPED | OUTPATIENT
Start: 2024-08-19

## 2024-08-22 RX ORDER — LISINOPRIL AND HYDROCHLOROTHIAZIDE 25; 20 MG/1; MG/1
1 TABLET ORAL DAILY
Qty: 90 TABLET | Refills: 1 | Status: SHIPPED | OUTPATIENT
Start: 2024-08-22

## 2024-09-03 RX ORDER — AMLODIPINE BESYLATE 2.5 MG/1
2.5 TABLET ORAL
Qty: 90 TABLET | Refills: 0 | Status: SHIPPED | OUTPATIENT
Start: 2024-09-03

## 2024-09-25 ENCOUNTER — OFFICE VISIT (OUTPATIENT)
Dept: FAMILY MEDICINE CLINIC | Facility: CLINIC | Age: 64
End: 2024-09-25
Payer: MEDICARE

## 2024-09-25 VITALS
TEMPERATURE: 96.4 F | WEIGHT: 207 LBS | OXYGEN SATURATION: 99 % | HEART RATE: 98 BPM | SYSTOLIC BLOOD PRESSURE: 124 MMHG | HEIGHT: 66 IN | BODY MASS INDEX: 33.27 KG/M2 | DIASTOLIC BLOOD PRESSURE: 78 MMHG

## 2024-09-25 DIAGNOSIS — E11.9 TYPE 2 DIABETES MELLITUS WITHOUT COMPLICATION, WITHOUT LONG-TERM CURRENT USE OF INSULIN: Primary | ICD-10-CM

## 2024-09-25 DIAGNOSIS — E66.09 CLASS 1 OBESITY DUE TO EXCESS CALORIES WITH SERIOUS COMORBIDITY AND BODY MASS INDEX (BMI) OF 33.0 TO 33.9 IN ADULT: ICD-10-CM

## 2024-09-25 DIAGNOSIS — Z00.00 PREVENTATIVE HEALTH CARE: ICD-10-CM

## 2024-09-25 DIAGNOSIS — Z12.31 OTHER SCREENING MAMMOGRAM: ICD-10-CM

## 2024-09-25 DIAGNOSIS — E78.2 MIXED HYPERLIPIDEMIA: ICD-10-CM

## 2024-09-25 PROBLEM — E66.811 CLASS 1 OBESITY DUE TO EXCESS CALORIES WITH BODY MASS INDEX (BMI) OF 33.0 TO 33.9 IN ADULT: Status: ACTIVE | Noted: 2024-09-25

## 2024-09-25 RX ORDER — LOTILANER OPHTHALMIC SOLUTION 2.5 MG/ML
SOLUTION/ DROPS OPHTHALMIC
COMMUNITY
Start: 2024-08-20

## 2024-09-27 LAB
ALBUMIN SERPL-MCNC: 4.4 G/DL (ref 3.9–4.9)
ALP SERPL-CCNC: 73 IU/L (ref 44–121)
ALT SERPL-CCNC: 28 IU/L (ref 0–32)
AST SERPL-CCNC: 27 IU/L (ref 0–40)
BASOPHILS # BLD AUTO: 0.1 X10E3/UL (ref 0–0.2)
BASOPHILS NFR BLD AUTO: 1 %
BILIRUB SERPL-MCNC: 0.8 MG/DL (ref 0–1.2)
BUN SERPL-MCNC: 15 MG/DL (ref 8–27)
BUN/CREAT SERPL: 16 (ref 12–28)
CALCIUM SERPL-MCNC: 9.5 MG/DL (ref 8.7–10.3)
CHLORIDE SERPL-SCNC: 101 MMOL/L (ref 96–106)
CHOLEST SERPL-MCNC: 211 MG/DL (ref 100–199)
CO2 SERPL-SCNC: 22 MMOL/L (ref 20–29)
CREAT SERPL-MCNC: 0.94 MG/DL (ref 0.57–1)
EGFRCR SERPLBLD CKD-EPI 2021: 67 ML/MIN/1.73
EOSINOPHIL # BLD AUTO: 0.2 X10E3/UL (ref 0–0.4)
EOSINOPHIL NFR BLD AUTO: 2 %
ERYTHROCYTE [DISTWIDTH] IN BLOOD BY AUTOMATED COUNT: 12.7 % (ref 11.7–15.4)
GLOBULIN SER CALC-MCNC: 2.7 G/DL (ref 1.5–4.5)
GLUCOSE SERPL-MCNC: 118 MG/DL (ref 70–99)
HBA1C MFR BLD: 6.2 % (ref 4.8–5.6)
HCT VFR BLD AUTO: 45.1 % (ref 34–46.6)
HCV IGG SERPL QL IA: NON REACTIVE
HDLC SERPL-MCNC: 44 MG/DL
HGB BLD-MCNC: 14.6 G/DL (ref 11.1–15.9)
IMM GRANULOCYTES # BLD AUTO: 0 X10E3/UL (ref 0–0.1)
IMM GRANULOCYTES NFR BLD AUTO: 0 %
LDLC SERPL CALC-MCNC: 129 MG/DL (ref 0–99)
LDLC/HDLC SERPL: 2.9 RATIO (ref 0–3.2)
LYMPHOCYTES # BLD AUTO: 4.9 X10E3/UL (ref 0.7–3.1)
LYMPHOCYTES NFR BLD AUTO: 39 %
MCH RBC QN AUTO: 30.7 PG (ref 26.6–33)
MCHC RBC AUTO-ENTMCNC: 32.4 G/DL (ref 31.5–35.7)
MCV RBC AUTO: 95 FL (ref 79–97)
MONOCYTES # BLD AUTO: 0.7 X10E3/UL (ref 0.1–0.9)
MONOCYTES NFR BLD AUTO: 6 %
NEUTROPHILS # BLD AUTO: 6.5 X10E3/UL (ref 1.4–7)
NEUTROPHILS NFR BLD AUTO: 52 %
PLATELET # BLD AUTO: 265 X10E3/UL (ref 150–450)
POTASSIUM SERPL-SCNC: 4.2 MMOL/L (ref 3.5–5.2)
PROT SERPL-MCNC: 7.1 G/DL (ref 6–8.5)
RBC # BLD AUTO: 4.76 X10E6/UL (ref 3.77–5.28)
SODIUM SERPL-SCNC: 139 MMOL/L (ref 134–144)
TRIGL SERPL-MCNC: 211 MG/DL (ref 0–149)
VLDLC SERPL CALC-MCNC: 38 MG/DL (ref 5–40)
WBC # BLD AUTO: 12.4 X10E3/UL (ref 3.4–10.8)

## 2024-10-02 ENCOUNTER — FLU SHOT (OUTPATIENT)
Dept: FAMILY MEDICINE CLINIC | Facility: CLINIC | Age: 64
End: 2024-10-02
Payer: MEDICARE

## 2024-10-02 DIAGNOSIS — Z23 NEED FOR INFLUENZA VACCINATION: Primary | ICD-10-CM

## 2024-10-02 PROCEDURE — 90662 IIV NO PRSV INCREASED AG IM: CPT | Performed by: FAMILY MEDICINE

## 2024-10-02 PROCEDURE — G0008 ADMIN INFLUENZA VIRUS VAC: HCPCS | Performed by: FAMILY MEDICINE

## 2024-11-14 RX ORDER — PRAVASTATIN SODIUM 20 MG
TABLET ORAL
Qty: 90 TABLET | Refills: 0 | Status: SHIPPED | OUTPATIENT
Start: 2024-11-14

## 2024-12-02 RX ORDER — AMLODIPINE BESYLATE 2.5 MG/1
2.5 TABLET ORAL
Qty: 90 TABLET | Refills: 0 | Status: SHIPPED | OUTPATIENT
Start: 2024-12-02

## 2024-12-20 ENCOUNTER — HOSPITAL ENCOUNTER (OUTPATIENT)
Dept: MAMMOGRAPHY | Facility: HOSPITAL | Age: 64
Discharge: HOME | End: 2024-12-20
Admitting: NURSE PRACTITIONER
Payer: MEDICARE

## 2024-12-20 DIAGNOSIS — Z12.31 OTHER SCREENING MAMMOGRAM: ICD-10-CM

## 2024-12-20 PROCEDURE — 77063 BREAST TOMOSYNTHESIS BI: CPT

## 2024-12-20 PROCEDURE — 77067 SCR MAMMO BI INCL CAD: CPT

## 2025-02-10 RX ORDER — PRAVASTATIN SODIUM 20 MG
TABLET ORAL
Qty: 90 TABLET | Refills: 0 | Status: SHIPPED | OUTPATIENT
Start: 2025-02-10

## 2025-02-17 RX ORDER — LISINOPRIL AND HYDROCHLOROTHIAZIDE 20; 25 MG/1; MG/1
1 TABLET ORAL DAILY
Qty: 90 TABLET | Refills: 1 | Status: SHIPPED | OUTPATIENT
Start: 2025-02-17

## 2025-02-17 RX ORDER — ATENOLOL 50 MG/1
TABLET ORAL
Qty: 90 TABLET | Refills: 1 | Status: SHIPPED | OUTPATIENT
Start: 2025-02-17

## 2025-02-27 RX ORDER — AMLODIPINE BESYLATE 2.5 MG/1
2.5 TABLET ORAL
Qty: 90 TABLET | Refills: 0 | Status: SHIPPED | OUTPATIENT
Start: 2025-02-27

## 2025-03-26 ENCOUNTER — OFFICE VISIT (OUTPATIENT)
Dept: FAMILY MEDICINE CLINIC | Facility: CLINIC | Age: 65
End: 2025-03-26
Payer: MEDICARE

## 2025-03-26 VITALS
HEART RATE: 68 BPM | OXYGEN SATURATION: 99 % | DIASTOLIC BLOOD PRESSURE: 73 MMHG | RESPIRATION RATE: 16 BRPM | SYSTOLIC BLOOD PRESSURE: 140 MMHG | BODY MASS INDEX: 32.82 KG/M2 | TEMPERATURE: 97.3 F | WEIGHT: 204.2 LBS | HEIGHT: 66 IN

## 2025-03-26 DIAGNOSIS — Z00.00 PREVENTATIVE HEALTH CARE: ICD-10-CM

## 2025-03-26 DIAGNOSIS — E11.9 TYPE 2 DIABETES MELLITUS WITHOUT COMPLICATION, WITHOUT LONG-TERM CURRENT USE OF INSULIN: Primary | ICD-10-CM

## 2025-03-26 DIAGNOSIS — R53.83 OTHER FATIGUE: ICD-10-CM

## 2025-03-26 DIAGNOSIS — E66.09 CLASS 1 OBESITY DUE TO EXCESS CALORIES WITH SERIOUS COMORBIDITY AND BODY MASS INDEX (BMI) OF 33.0 TO 33.9 IN ADULT: ICD-10-CM

## 2025-03-26 DIAGNOSIS — E66.811 CLASS 1 OBESITY DUE TO EXCESS CALORIES WITH SERIOUS COMORBIDITY AND BODY MASS INDEX (BMI) OF 33.0 TO 33.9 IN ADULT: ICD-10-CM

## 2025-03-26 DIAGNOSIS — E78.2 MIXED HYPERLIPIDEMIA: ICD-10-CM

## 2025-03-26 NOTE — PROGRESS NOTES
"    Mauricio Jacome is a 64 y.o. female.     History of Present Illness  64-year-old white female with history of depression anxiety, type 2 diabetes, migraines, fatigue, cervical stricture, insomnia and peripheral neuropathy who comes in today for 6-month follow-up visit and fasting blood work    Blood pressure 140/72 heart rate 68 she denies any chest pain, dyspnea, tachycardia or dizziness    Patient has no new complaints is doing well.  Last blood sugar was 118 A1c 6.2 to glycerides 211.  Her weight is 2 4 for BMI of 33.5.  She has had 3 COVID vaccines is up-to-date on eye exam.  Mammogram and bone scan are due in December 2025.  She had a hysterectomy no longer does Pap smears and her next colonoscopy is due in 2030            Fasting blood work  Diet and exercise  Follow-up 6 months for Medicare wellness  Diabetes         The following portions of the patient's history were reviewed and updated as appropriate: allergies, current medications, past family history, past medical history, past social history, past surgical history, and problem list.    Vitals:    03/26/25 0825   BP: 140/73   BP Location: Right arm   Patient Position: Sitting   Cuff Size: Large Adult   Pulse: 68   Resp: 16   Temp: 97.3 °F (36.3 °C)   SpO2: 99%   Weight: 92.6 kg (204 lb 3.2 oz)   Height: 166.4 cm (65.51\")       Past Medical History:   Diagnosis Date    Diabetes mellitus     Hyperlipidemia     Hypertension     Obesity      Past Surgical History:   Procedure Laterality Date    APPENDECTOMY      CHOLECYSTECTOMY      HYSTERECTOMY       Family History   Problem Relation Age of Onset    Heart failure Mother     Multiple myeloma Mother     Kidney disease Father     Dementia Father     Diabetes Father     Heart disease Father      Immunization History   Administered Date(s) Administered    COVID-19 (MODERNA) 1st,2nd,3rd Dose Monovalent 03/16/2021, 04/13/2021    COVID-19 (PFIZER) Purple Cap Monovalent 12/18/2021    Fluzone  >6mos 12/01/2014 "    Fluzone (or Fluarix & Flulaval for VFC) >6mos 09/22/2020, 09/22/2022, 10/05/2023    Fluzone High-Dose 65+YRS 10/02/2024    Influenza Injectable Mdck Pf Quad 10/12/2018, 12/01/2021    Pneumococcal Conjugate 20-Valent (PCV20) 09/22/2022    flucelvax quad pfs =>4 YRS 12/02/2019       Office Visit on 09/25/2024   Component Date Value Ref Range Status    Hep C Virus Ab 09/26/2024 Non Reactive  Non Reactive Final    Comment: HCV antibody alone does not differentiate between previously  resolved infection and active infection. Equivocal and Reactive  HCV antibody results should be followed up with an HCV RNA test  to support the diagnosis of active HCV infection.      WBC 09/26/2024 12.4 (H)  3.4 - 10.8 x10E3/uL Final    RBC 09/26/2024 4.76  3.77 - 5.28 x10E6/uL Final    Hemoglobin 09/26/2024 14.6  11.1 - 15.9 g/dL Final    Hematocrit 09/26/2024 45.1  34.0 - 46.6 % Final    MCV 09/26/2024 95  79 - 97 fL Final    MCH 09/26/2024 30.7  26.6 - 33.0 pg Final    MCHC 09/26/2024 32.4  31.5 - 35.7 g/dL Final    RDW 09/26/2024 12.7  11.7 - 15.4 % Final    Platelets 09/26/2024 265  150 - 450 x10E3/uL Final    Neutrophil Rel % 09/26/2024 52  Not Estab. % Final    Lymphocyte Rel % 09/26/2024 39  Not Estab. % Final    Monocyte Rel % 09/26/2024 6  Not Estab. % Final    Eosinophil Rel % 09/26/2024 2  Not Estab. % Final    Basophil Rel % 09/26/2024 1  Not Estab. % Final    Neutrophils Absolute 09/26/2024 6.5  1.4 - 7.0 x10E3/uL Final    Lymphocytes Absolute 09/26/2024 4.9 (H)  0.7 - 3.1 x10E3/uL Final    Monocytes Absolute 09/26/2024 0.7  0.1 - 0.9 x10E3/uL Final    Eosinophils Absolute 09/26/2024 0.2  0.0 - 0.4 x10E3/uL Final    Basophils Absolute 09/26/2024 0.1  0.0 - 0.2 x10E3/uL Final    Immature Granulocyte Rel % 09/26/2024 0  Not Estab. % Final    Immature Grans Absolute 09/26/2024 0.0  0.0 - 0.1 x10E3/uL Final    Glucose 09/26/2024 118 (H)  70 - 99 mg/dL Final    BUN 09/26/2024 15  8 - 27 mg/dL Final    Creatinine 09/26/2024  0.94  0.57 - 1.00 mg/dL Final    EGFR Result 09/26/2024 67  >59 mL/min/1.73 Final    BUN/Creatinine Ratio 09/26/2024 16  12 - 28 Final    Sodium 09/26/2024 139  134 - 144 mmol/L Final    Potassium 09/26/2024 4.2  3.5 - 5.2 mmol/L Final    Chloride 09/26/2024 101  96 - 106 mmol/L Final    Total CO2 09/26/2024 22  20 - 29 mmol/L Final    Calcium 09/26/2024 9.5  8.7 - 10.3 mg/dL Final    Total Protein 09/26/2024 7.1  6.0 - 8.5 g/dL Final    Albumin 09/26/2024 4.4  3.9 - 4.9 g/dL Final    Globulin 09/26/2024 2.7  1.5 - 4.5 g/dL Final    Total Bilirubin 09/26/2024 0.8  0.0 - 1.2 mg/dL Final    Alkaline Phosphatase 09/26/2024 73  44 - 121 IU/L Final    AST (SGOT) 09/26/2024 27  0 - 40 IU/L Final    ALT (SGPT) 09/26/2024 28  0 - 32 IU/L Final    Total Cholesterol 09/26/2024 211 (H)  100 - 199 mg/dL Final    Triglycerides 09/26/2024 211 (H)  0 - 149 mg/dL Final    HDL Cholesterol 09/26/2024 44  >39 mg/dL Final    VLDL Cholesterol Obie 09/26/2024 38  5 - 40 mg/dL Final    LDL Chol Calc (NIH) 09/26/2024 129 (H)  0 - 99 mg/dL Final    LDL/HDL RATIO 09/26/2024 2.9  0.0 - 3.2 ratio Final    Comment:                                     LDL/HDL Ratio                                              Men  Women                                1/2 Avg.Risk  1.0    1.5                                    Avg.Risk  3.6    3.2                                 2X Avg.Risk  6.2    5.0                                 3X Avg.Risk  8.0    6.1      Hemoglobin A1C 09/26/2024 6.2 (H)  4.8 - 5.6 % Final    Comment:          Prediabetes: 5.7 - 6.4           Diabetes: >6.4           Glycemic control for adults with diabetes: <7.0           Review of Systems   Constitutional: Negative.    HENT: Negative.     Respiratory: Negative.     Cardiovascular: Negative.    Gastrointestinal: Negative.    Genitourinary: Negative.    Musculoskeletal: Negative.    Skin: Negative.    Neurological: Negative.    Psychiatric/Behavioral: Negative.         Objective    Physical Exam  Constitutional:       Appearance: Normal appearance.   HENT:      Head: Normocephalic.   Cardiovascular:      Rate and Rhythm: Normal rate and regular rhythm.      Pulses: Normal pulses.      Heart sounds: Normal heart sounds.   Pulmonary:      Effort: Pulmonary effort is normal.      Breath sounds: Normal breath sounds.   Abdominal:      General: Bowel sounds are normal.   Musculoskeletal:         General: Normal range of motion.   Skin:     General: Skin is warm.   Neurological:      General: No focal deficit present.      Mental Status: She is alert and oriented to person, place, and time.   Psychiatric:         Mood and Affect: Mood normal.         Behavior: Behavior normal.         Procedures    Assessment & Plan   Diagnoses and all orders for this visit:    1. Type 2 diabetes mellitus without complication, without long-term current use of insulin (Primary)  -     Comprehensive Metabolic Panel  -     Hemoglobin A1c  -     Microalbumin / Creatinine Urine Ratio - Urine, Clean Catch    2. Mixed hyperlipidemia  -     Lipid Panel With LDL / HDL Ratio    3. Other fatigue  -     TSH+Free T4  -     T3    4. Preventative health care  -     CBC & Differential           Current Outpatient Medications:     amLODIPine (NORVASC) 2.5 MG tablet, TAKE 1 TABLET BY MOUTH EVERYDAY AT BEDTIME, Disp: 90 tablet, Rfl: 0    atenolol (TENORMIN) 50 MG tablet, TAKE 1 TABLET BY MOUTH EVERY DAY, Disp: 90 tablet, Rfl: 1    lisinopril-hydrochlorothiazide (PRINZIDE,ZESTORETIC) 20-25 MG per tablet, TAKE 1 TABLET BY MOUTH EVERY DAY, Disp: 90 tablet, Rfl: 1    metFORMIN (GLUCOPHAGE) 500 MG tablet, TAKE 1 TABLET BY MOUTH TWICE A DAY WITH FOOD, Disp: 180 tablet, Rfl: 1    pravastatin (PRAVACHOL) 20 MG tablet, TAKE 1 TABLET BY MOUTH EVERY DAY, Disp: 90 tablet, Rfl: 0    Xdemvy 0.25 % solution, , Disp: , Rfl:            Alexia Vang, APRN 3/26/2025 08:42 EDT  This note has been electronically signed

## 2025-03-27 LAB
ALBUMIN SERPL-MCNC: 4.4 G/DL (ref 3.9–4.9)
ALBUMIN/CREAT UR: 10 MG/G CREAT (ref 0–29)
ALP SERPL-CCNC: 80 IU/L (ref 44–121)
ALT SERPL-CCNC: 26 IU/L (ref 0–32)
AST SERPL-CCNC: 23 IU/L (ref 0–40)
BASOPHILS # BLD AUTO: 0.1 X10E3/UL (ref 0–0.2)
BASOPHILS NFR BLD AUTO: 1 %
BILIRUB SERPL-MCNC: 0.8 MG/DL (ref 0–1.2)
BUN SERPL-MCNC: 14 MG/DL (ref 8–27)
BUN/CREAT SERPL: 16 (ref 12–28)
CALCIUM SERPL-MCNC: 9.7 MG/DL (ref 8.7–10.3)
CHLORIDE SERPL-SCNC: 100 MMOL/L (ref 96–106)
CHOLEST SERPL-MCNC: 238 MG/DL (ref 100–199)
CO2 SERPL-SCNC: 23 MMOL/L (ref 20–29)
CREAT SERPL-MCNC: 0.86 MG/DL (ref 0.57–1)
CREAT UR-MCNC: 149.4 MG/DL
EGFRCR SERPLBLD CKD-EPI 2021: 75 ML/MIN/1.73
EOSINOPHIL # BLD AUTO: 0.2 X10E3/UL (ref 0–0.4)
EOSINOPHIL NFR BLD AUTO: 2 %
ERYTHROCYTE [DISTWIDTH] IN BLOOD BY AUTOMATED COUNT: 12.7 % (ref 11.7–15.4)
GLOBULIN SER CALC-MCNC: 2.8 G/DL (ref 1.5–4.5)
GLUCOSE SERPL-MCNC: 146 MG/DL (ref 70–99)
HBA1C MFR BLD: 6.4 % (ref 4.8–5.6)
HCT VFR BLD AUTO: 45.4 % (ref 34–46.6)
HDLC SERPL-MCNC: 46 MG/DL
HGB BLD-MCNC: 15.3 G/DL (ref 11.1–15.9)
IMM GRANULOCYTES # BLD AUTO: 0 X10E3/UL (ref 0–0.1)
IMM GRANULOCYTES NFR BLD AUTO: 0 %
LDLC SERPL CALC-MCNC: 155 MG/DL (ref 0–99)
LDLC/HDLC SERPL: 3.4 RATIO (ref 0–3.2)
LYMPHOCYTES # BLD AUTO: 3.1 X10E3/UL (ref 0.7–3.1)
LYMPHOCYTES NFR BLD AUTO: 31 %
MCH RBC QN AUTO: 31 PG (ref 26.6–33)
MCHC RBC AUTO-ENTMCNC: 33.7 G/DL (ref 31.5–35.7)
MCV RBC AUTO: 92 FL (ref 79–97)
MICROALBUMIN UR-MCNC: 15 UG/ML
MONOCYTES # BLD AUTO: 0.6 X10E3/UL (ref 0.1–0.9)
MONOCYTES NFR BLD AUTO: 6 %
NEUTROPHILS # BLD AUTO: 6 X10E3/UL (ref 1.4–7)
NEUTROPHILS NFR BLD AUTO: 60 %
PLATELET # BLD AUTO: 252 X10E3/UL (ref 150–450)
POTASSIUM SERPL-SCNC: 3.8 MMOL/L (ref 3.5–5.2)
PROT SERPL-MCNC: 7.2 G/DL (ref 6–8.5)
RBC # BLD AUTO: 4.94 X10E6/UL (ref 3.77–5.28)
SODIUM SERPL-SCNC: 139 MMOL/L (ref 134–144)
T3 SERPL-MCNC: 114 NG/DL (ref 71–180)
T4 FREE SERPL-MCNC: 1.35 NG/DL (ref 0.82–1.77)
TRIGL SERPL-MCNC: 202 MG/DL (ref 0–149)
TSH SERPL DL<=0.005 MIU/L-ACNC: 1.39 UIU/ML (ref 0.45–4.5)
VLDLC SERPL CALC-MCNC: 37 MG/DL (ref 5–40)
WBC # BLD AUTO: 10 X10E3/UL (ref 3.4–10.8)

## 2025-05-12 RX ORDER — PRAVASTATIN SODIUM 20 MG
20 TABLET ORAL DAILY
Qty: 90 TABLET | Refills: 0 | Status: SHIPPED | OUTPATIENT
Start: 2025-05-12

## 2025-05-28 RX ORDER — AMLODIPINE BESYLATE 2.5 MG/1
2.5 TABLET ORAL
Qty: 90 TABLET | Refills: 0 | Status: SHIPPED | OUTPATIENT
Start: 2025-05-28

## 2025-07-01 ENCOUNTER — OFFICE VISIT (OUTPATIENT)
Dept: FAMILY MEDICINE CLINIC | Facility: CLINIC | Age: 65
End: 2025-07-01
Payer: MEDICARE

## 2025-07-01 VITALS
RESPIRATION RATE: 16 BRPM | BODY MASS INDEX: 32.33 KG/M2 | DIASTOLIC BLOOD PRESSURE: 71 MMHG | TEMPERATURE: 96.9 F | HEART RATE: 75 BPM | OXYGEN SATURATION: 98 % | SYSTOLIC BLOOD PRESSURE: 141 MMHG | WEIGHT: 201.2 LBS | HEIGHT: 66 IN

## 2025-07-01 DIAGNOSIS — E66.09 CLASS 1 OBESITY DUE TO EXCESS CALORIES WITH SERIOUS COMORBIDITY AND BODY MASS INDEX (BMI) OF 32.0 TO 32.9 IN ADULT: ICD-10-CM

## 2025-07-01 DIAGNOSIS — E78.2 MIXED HYPERLIPIDEMIA: ICD-10-CM

## 2025-07-01 DIAGNOSIS — E66.811 CLASS 1 OBESITY DUE TO EXCESS CALORIES WITH SERIOUS COMORBIDITY AND BODY MASS INDEX (BMI) OF 32.0 TO 32.9 IN ADULT: ICD-10-CM

## 2025-07-01 DIAGNOSIS — E11.9 TYPE 2 DIABETES MELLITUS WITHOUT COMPLICATION, WITHOUT LONG-TERM CURRENT USE OF INSULIN: Primary | ICD-10-CM

## 2025-07-01 RX ORDER — EZETIMIBE 10 MG/1
10 TABLET ORAL DAILY
Qty: 90 TABLET | Refills: 0 | Status: SHIPPED | OUTPATIENT
Start: 2025-07-01

## 2025-07-01 RX ORDER — LISINOPRIL AND HYDROCHLOROTHIAZIDE 20; 25 MG/1; MG/1
1 TABLET ORAL DAILY
Qty: 90 TABLET | Refills: 1 | Status: SHIPPED | OUTPATIENT
Start: 2025-07-01

## 2025-07-01 RX ORDER — ATENOLOL 50 MG/1
50 TABLET ORAL DAILY
Qty: 90 TABLET | Refills: 1 | Status: SHIPPED | OUTPATIENT
Start: 2025-07-01

## 2025-07-01 NOTE — PROGRESS NOTES
"    Mauricio Jacome is a 64 y.o. female.     History of Present Illness  64-year-old white female with history of depression anxiety, type 2 diabetes, migraines, fatigue, cervical stricture, insomnia and peripheral neuropathy who comes in today for follow-up visit fasting blood work    Blood pressure 140/70 heart rate 74 she denies any chest pain, dyspnea, tachycardia or dizziness    Patient's last blood sugar was 146 A1c 6.4 will be doing fasting labs today.  Her lipid panel is elevated and she is unable to tolerate statins unfortunately.  Will plan to put her on some Zetia 10 mg daily    Patient is wanting to lose weight however she is not sure she wants to try the GLP-1 or not.  I did recommend her to  try the Atkins diet and try to increase her activity.  She thinks they might be able to do that.  Also suggested intermittent fasting like stopping food at 7 PM until 11 AM the next day.  Patient is following up in 3 months we will see how she does with this diet plan    Weight is 201 with a BMI of 32.9.  She has had 3 COVID vaccines she is up-to-date on her eye exam.  Her mammogram and bone scan are due in December 2025 and her next colonoscopy is due in 2030.  She has had hysterectomy does not do Pap smears            Fasting blood work  Zetia 10 mg daily  Consider Atkins diet  Increase exercise  Consider intermittent fasting  Follow-up 3 months  Diabetes       The following portions of the patient's history were reviewed and updated as appropriate: allergies, current medications, past family history, past medical history, past social history, past surgical history, and problem list.    Vitals:    07/01/25 0956   BP: 141/71   BP Location: Right arm   Patient Position: Sitting   Cuff Size: Large Adult   Pulse: 75   Resp: 16   Temp: 96.9 °F (36.1 °C)   SpO2: 98%   Weight: 91.3 kg (201 lb 3.2 oz)   Height: 166.6 cm (65.61\")       Past Medical History:   Diagnosis Date    Diabetes mellitus     Hyperlipidemia  "    Hypertension     Obesity      Past Surgical History:   Procedure Laterality Date    APPENDECTOMY      CHOLECYSTECTOMY      HYSTERECTOMY       Family History   Problem Relation Age of Onset    Heart failure Mother     Multiple myeloma Mother     Kidney disease Father     Dementia Father     Diabetes Father     Heart disease Father      Immunization History   Administered Date(s) Administered    COVID-19 (MODERNA) 1st,2nd,3rd Dose Monovalent 03/16/2021, 04/13/2021    COVID-19 (PFIZER) Purple Cap Monovalent 12/18/2021    Fluzone  >6mos 12/01/2014    Fluzone (or Fluarix & Flulaval for VFC) >6mos 09/22/2020, 09/22/2022, 10/05/2023    Fluzone High-Dose 65+YRS 10/02/2024    Influenza Injectable Mdck Pf Quad 10/12/2018, 12/01/2021    Pneumococcal Conjugate 20-Valent (PCV20) 09/22/2022    flucelvax quad pfs =>4 YRS 12/02/2019       Office Visit on 03/26/2025   Component Date Value Ref Range Status    WBC 03/26/2025 10.0  3.4 - 10.8 x10E3/uL Final    RBC 03/26/2025 4.94  3.77 - 5.28 x10E6/uL Final    Hemoglobin 03/26/2025 15.3  11.1 - 15.9 g/dL Final    Hematocrit 03/26/2025 45.4  34.0 - 46.6 % Final    MCV 03/26/2025 92  79 - 97 fL Final    MCH 03/26/2025 31.0  26.6 - 33.0 pg Final    MCHC 03/26/2025 33.7  31.5 - 35.7 g/dL Final    RDW 03/26/2025 12.7  11.7 - 15.4 % Final    Platelets 03/26/2025 252  150 - 450 x10E3/uL Final    Neutrophil Rel % 03/26/2025 60  Not Estab. % Final    Lymphocyte Rel % 03/26/2025 31  Not Estab. % Final    Monocyte Rel % 03/26/2025 6  Not Estab. % Final    Eosinophil Rel % 03/26/2025 2  Not Estab. % Final    Basophil Rel % 03/26/2025 1  Not Estab. % Final    Neutrophils Absolute 03/26/2025 6.0  1.4 - 7.0 x10E3/uL Final    Lymphocytes Absolute 03/26/2025 3.1  0.7 - 3.1 x10E3/uL Final    Monocytes Absolute 03/26/2025 0.6  0.1 - 0.9 x10E3/uL Final    Eosinophils Absolute 03/26/2025 0.2  0.0 - 0.4 x10E3/uL Final    Basophils Absolute 03/26/2025 0.1  0.0 - 0.2 x10E3/uL Final    Immature Granulocyte  Rel % 03/26/2025 0  Not Estab. % Final    Immature Grans Absolute 03/26/2025 0.0  0.0 - 0.1 x10E3/uL Final    Glucose 03/26/2025 146 (H)  70 - 99 mg/dL Final    BUN 03/26/2025 14  8 - 27 mg/dL Final    Creatinine 03/26/2025 0.86  0.57 - 1.00 mg/dL Final    EGFR Result 03/26/2025 75  >59 mL/min/1.73 Final    BUN/Creatinine Ratio 03/26/2025 16  12 - 28 Final    Sodium 03/26/2025 139  134 - 144 mmol/L Final    Potassium 03/26/2025 3.8  3.5 - 5.2 mmol/L Final    Chloride 03/26/2025 100  96 - 106 mmol/L Final    Total CO2 03/26/2025 23  20 - 29 mmol/L Final    Calcium 03/26/2025 9.7  8.7 - 10.3 mg/dL Final    Total Protein 03/26/2025 7.2  6.0 - 8.5 g/dL Final    Albumin 03/26/2025 4.4  3.9 - 4.9 g/dL Final    Globulin 03/26/2025 2.8  1.5 - 4.5 g/dL Final    Total Bilirubin 03/26/2025 0.8  0.0 - 1.2 mg/dL Final    Alkaline Phosphatase 03/26/2025 80  44 - 121 IU/L Final    AST (SGOT) 03/26/2025 23  0 - 40 IU/L Final    ALT (SGPT) 03/26/2025 26  0 - 32 IU/L Final    Hemoglobin A1C 03/26/2025 6.4 (H)  4.8 - 5.6 % Final    Comment:          Prediabetes: 5.7 - 6.4           Diabetes: >6.4           Glycemic control for adults with diabetes: <7.0      Total Cholesterol 03/26/2025 238 (H)  100 - 199 mg/dL Final    Triglycerides 03/26/2025 202 (H)  0 - 149 mg/dL Final    HDL Cholesterol 03/26/2025 46  >39 mg/dL Final    VLDL Cholesterol Obie 03/26/2025 37  5 - 40 mg/dL Final    LDL Chol Calc (NIH) 03/26/2025 155 (H)  0 - 99 mg/dL Final    LDL/HDL RATIO 03/26/2025 3.4 (H)  0.0 - 3.2 ratio Final    Comment:                                     LDL/HDL Ratio                                              Men  Women                                1/2 Avg.Risk  1.0    1.5                                    Avg.Risk  3.6    3.2                                 2X Avg.Risk  6.2    5.0                                 3X Avg.Risk  8.0    6.1      TSH 03/26/2025 1.390  0.450 - 4.500 uIU/mL Final    Free T4 03/26/2025 1.35  0.82 - 1.77 ng/dL  Final    T3, Total 03/26/2025 114  71 - 180 ng/dL Final    Creatinine, Urine 03/26/2025 149.4  Not Estab. mg/dL Final    Microalbumin, Urine 03/26/2025 15.0  Not Estab. ug/mL Final    Microalbumin/Creatinine Ratio 03/26/2025 10  0 - 29 mg/g creat Final    Comment:                        Normal:                0 -  29                         Moderately increased: 30 - 300                         Severely increased:       >300           Review of Systems   Constitutional: Negative.    HENT: Negative.     Respiratory: Negative.     Cardiovascular: Negative.    Gastrointestinal: Negative.    Genitourinary: Negative.    Musculoskeletal: Negative.    Skin: Negative.    Neurological: Negative.    Psychiatric/Behavioral: Negative.         Objective   Physical Exam  Constitutional:       Appearance: Normal appearance.   HENT:      Head: Normocephalic.   Cardiovascular:      Rate and Rhythm: Normal rate and regular rhythm.      Pulses: Normal pulses.      Heart sounds: Normal heart sounds.   Pulmonary:      Effort: Pulmonary effort is normal.      Breath sounds: Normal breath sounds.   Abdominal:      General: Bowel sounds are normal.   Musculoskeletal:         General: Normal range of motion.   Skin:     General: Skin is warm.   Neurological:      General: No focal deficit present.      Mental Status: She is alert and oriented to person, place, and time.   Psychiatric:         Mood and Affect: Mood normal.         Behavior: Behavior normal.         Procedures    Assessment & Plan   Diagnoses and all orders for this visit:    1. Type 2 diabetes mellitus without complication, without long-term current use of insulin (Primary)  -     Comprehensive Metabolic Panel  -     Hemoglobin A1c    2. Mixed hyperlipidemia  -     Lipid Panel With LDL / HDL Ratio    Other orders  -     atenolol (TENORMIN) 50 MG tablet; Take 1 tablet by mouth Daily.  Dispense: 90 tablet; Refill: 1  -     lisinopril-hydrochlorothiazide (PRINZIDE,ZESTORETIC)  20-25 MG per tablet; Take 1 tablet by mouth Daily.  Dispense: 90 tablet; Refill: 1  -     ezetimibe (Zetia) 10 MG tablet; Take 1 tablet by mouth Daily.  Dispense: 90 tablet; Refill: 0  -     metFORMIN (GLUCOPHAGE) 500 MG tablet; Take 1 tablet by mouth 2 (Two) Times a Day With Meals.  Dispense: 180 tablet; Refill: 1           Current Outpatient Medications:     atenolol (TENORMIN) 50 MG tablet, Take 1 tablet by mouth Daily., Disp: 90 tablet, Rfl: 1    lisinopril-hydrochlorothiazide (PRINZIDE,ZESTORETIC) 20-25 MG per tablet, Take 1 tablet by mouth Daily., Disp: 90 tablet, Rfl: 1    metFORMIN (GLUCOPHAGE) 500 MG tablet, Take 1 tablet by mouth 2 (Two) Times a Day With Meals., Disp: 180 tablet, Rfl: 1    amLODIPine (NORVASC) 2.5 MG tablet, TAKE 1 TABLET BY MOUTH EVERYDAY AT BEDTIME, Disp: 90 tablet, Rfl: 0    ezetimibe (Zetia) 10 MG tablet, Take 1 tablet by mouth Daily., Disp: 90 tablet, Rfl: 0           WALKER De La Rosa 7/1/2025 10:35 EDT  This note has been electronically signed

## 2025-07-01 NOTE — PATIENT INSTRUCTIONS
Fasting blood work  Zetia 10 mg daily  Consider Atkins diet  Increase exercise  Consider intermittent fasting  Follow-up 3 months

## 2025-07-02 LAB
ALBUMIN SERPL-MCNC: 4.6 G/DL (ref 3.9–4.9)
ALP SERPL-CCNC: 72 IU/L (ref 44–121)
ALT SERPL-CCNC: 29 IU/L (ref 0–32)
AST SERPL-CCNC: 25 IU/L (ref 0–40)
BILIRUB SERPL-MCNC: 1 MG/DL (ref 0–1.2)
BUN SERPL-MCNC: 17 MG/DL (ref 8–27)
BUN/CREAT SERPL: 19 (ref 12–28)
CALCIUM SERPL-MCNC: 9.9 MG/DL (ref 8.7–10.3)
CHLORIDE SERPL-SCNC: 100 MMOL/L (ref 96–106)
CHOLEST SERPL-MCNC: 207 MG/DL (ref 100–199)
CO2 SERPL-SCNC: 21 MMOL/L (ref 20–29)
CREAT SERPL-MCNC: 0.88 MG/DL (ref 0.57–1)
EGFRCR SERPLBLD CKD-EPI 2021: 73 ML/MIN/1.73
GLOBULIN SER CALC-MCNC: 2.7 G/DL (ref 1.5–4.5)
GLUCOSE SERPL-MCNC: 120 MG/DL (ref 70–99)
HBA1C MFR BLD: 6.1 % (ref 4.8–5.6)
HDLC SERPL-MCNC: 47 MG/DL
LDLC SERPL CALC-MCNC: 122 MG/DL (ref 0–99)
LDLC/HDLC SERPL: 2.6 RATIO (ref 0–3.2)
POTASSIUM SERPL-SCNC: 4.1 MMOL/L (ref 3.5–5.2)
PROT SERPL-MCNC: 7.3 G/DL (ref 6–8.5)
SODIUM SERPL-SCNC: 137 MMOL/L (ref 134–144)
TRIGL SERPL-MCNC: 215 MG/DL (ref 0–149)
VLDLC SERPL CALC-MCNC: 38 MG/DL (ref 5–40)

## 2025-08-25 RX ORDER — AMLODIPINE BESYLATE 2.5 MG/1
2.5 TABLET ORAL
Qty: 90 TABLET | Refills: 0 | Status: SHIPPED | OUTPATIENT
Start: 2025-08-25